# Patient Record
Sex: FEMALE | Race: BLACK OR AFRICAN AMERICAN | Employment: FULL TIME | URBAN - NONMETROPOLITAN AREA
[De-identification: names, ages, dates, MRNs, and addresses within clinical notes are randomized per-mention and may not be internally consistent; named-entity substitution may affect disease eponyms.]

---
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
## 2023-05-21 ENCOUNTER — HOSPITAL ENCOUNTER (INPATIENT)
Age: 18
LOS: 3 days | Discharge: HOME | End: 2023-05-26
Attending: EMERGENCY MEDICINE | Admitting: PHYSICIAN ASSISTANT
Payer: MEDICAID

## 2023-05-21 ENCOUNTER — APPOINTMENT (OUTPATIENT)
Dept: CT IMAGING | Age: 18
End: 2023-05-21
Payer: MEDICAID

## 2023-05-21 DIAGNOSIS — K27.9 PEPTIC ULCER DISEASE: Primary | ICD-10-CM

## 2023-05-21 DIAGNOSIS — R11.2 NAUSEA AND VOMITING, UNSPECIFIED VOMITING TYPE: ICD-10-CM

## 2023-05-21 LAB
ALBUMIN SERPL BCG-MCNC: 5.3 G/DL (ref 3.5–5.1)
ALP SERPL-CCNC: 92 U/L (ref 38–126)
ALT SERPL W/O P-5'-P-CCNC: 15 U/L (ref 11–66)
ANION GAP SERPL CALC-SCNC: 17 MEQ/L (ref 8–16)
AST SERPL-CCNC: 19 U/L (ref 5–40)
B-HCG SERPL QL: NEGATIVE
BASOPHILS ABSOLUTE: 0 THOU/MM3 (ref 0–0.1)
BASOPHILS NFR BLD AUTO: 0.1 %
BILIRUB CONJ SERPL-MCNC: 0.3 MG/DL (ref 0–0.3)
BILIRUB SERPL-MCNC: 1.8 MG/DL (ref 0.3–1.2)
BUN SERPL-MCNC: 14 MG/DL (ref 7–22)
CALCIUM SERPL-MCNC: 10.2 MG/DL (ref 8.5–10.5)
CHLORIDE SERPL-SCNC: 101 MEQ/L (ref 98–111)
CO2 SERPL-SCNC: 20 MEQ/L (ref 23–33)
CREAT SERPL-MCNC: 0.6 MG/DL (ref 0.4–1.2)
DEPRECATED RDW RBC AUTO: 42.2 FL (ref 35–45)
EOSINOPHIL NFR BLD AUTO: 0 %
EOSINOPHILS ABSOLUTE: 0 THOU/MM3 (ref 0–0.4)
ERYTHROCYTE [DISTWIDTH] IN BLOOD BY AUTOMATED COUNT: 13.2 % (ref 11.5–14.5)
GFR SERPL CREATININE-BSD FRML MDRD: > 60 ML/MIN/1.73M2
GLUCOSE SERPL-MCNC: 95 MG/DL (ref 70–108)
HCT VFR BLD AUTO: 38.9 % (ref 37–47)
HGB BLD-MCNC: 13.2 GM/DL (ref 12–16)
IMM GRANULOCYTES # BLD AUTO: 0.01 THOU/MM3 (ref 0–0.07)
IMM GRANULOCYTES NFR BLD AUTO: 0.1 %
LACTATE SERPL-SCNC: 2.1 MMOL/L (ref 0.5–2)
LIPASE SERPL-CCNC: 15.5 U/L (ref 5.6–51.3)
LYMPHOCYTES ABSOLUTE: 1.3 THOU/MM3 (ref 1–4.8)
LYMPHOCYTES NFR BLD AUTO: 19.2 %
MCH RBC QN AUTO: 29.8 PG (ref 26–33)
MCHC RBC AUTO-ENTMCNC: 33.9 GM/DL (ref 32.2–35.5)
MCV RBC AUTO: 87.8 FL (ref 81–99)
MONOCYTES ABSOLUTE: 0.7 THOU/MM3 (ref 0.4–1.3)
MONOCYTES NFR BLD AUTO: 9.8 %
NEUTROPHILS NFR BLD AUTO: 70.8 %
NRBC BLD AUTO-RTO: 0 /100 WBC
OSMOLALITY SERPL CALC.SUM OF ELEC: 276 MOSMOL/KG (ref 275–300)
PLATELET # BLD AUTO: 368 THOU/MM3 (ref 130–400)
PMV BLD AUTO: 10.9 FL (ref 9.4–12.4)
POTASSIUM SERPL-SCNC: 3.3 MEQ/L (ref 3.5–5.2)
PROT SERPL-MCNC: 7.7 G/DL (ref 6.1–8)
RBC # BLD AUTO: 4.43 MILL/MM3 (ref 4.2–5.4)
SEGMENTED NEUTROPHILS ABSOLUTE COUNT: 4.8 THOU/MM3 (ref 1.8–7.7)
SODIUM SERPL-SCNC: 138 MEQ/L (ref 135–145)
WBC # BLD AUTO: 6.8 THOU/MM3 (ref 4.8–10.8)

## 2023-05-21 PROCEDURE — 6360000002 HC RX W HCPCS: Performed by: STUDENT IN AN ORGANIZED HEALTH CARE EDUCATION/TRAINING PROGRAM

## 2023-05-21 PROCEDURE — 96374 THER/PROPH/DIAG INJ IV PUSH: CPT

## 2023-05-21 PROCEDURE — 6360000002 HC RX W HCPCS: Performed by: EMERGENCY MEDICINE

## 2023-05-21 PROCEDURE — 83690 ASSAY OF LIPASE: CPT

## 2023-05-21 PROCEDURE — 96361 HYDRATE IV INFUSION ADD-ON: CPT

## 2023-05-21 PROCEDURE — 83605 ASSAY OF LACTIC ACID: CPT

## 2023-05-21 PROCEDURE — 6360000002 HC RX W HCPCS: Performed by: PHYSICIAN ASSISTANT

## 2023-05-21 PROCEDURE — 96376 TX/PRO/DX INJ SAME DRUG ADON: CPT

## 2023-05-21 PROCEDURE — 74176 CT ABD & PELVIS W/O CONTRAST: CPT

## 2023-05-21 PROCEDURE — 82248 BILIRUBIN DIRECT: CPT

## 2023-05-21 PROCEDURE — G0378 HOSPITAL OBSERVATION PER HR: HCPCS

## 2023-05-21 PROCEDURE — 85025 COMPLETE CBC W/AUTO DIFF WBC: CPT

## 2023-05-21 PROCEDURE — 99222 1ST HOSP IP/OBS MODERATE 55: CPT | Performed by: PHYSICIAN ASSISTANT

## 2023-05-21 PROCEDURE — 36415 COLL VENOUS BLD VENIPUNCTURE: CPT

## 2023-05-21 PROCEDURE — 80053 COMPREHEN METABOLIC PANEL: CPT

## 2023-05-21 PROCEDURE — 84703 CHORIONIC GONADOTROPIN ASSAY: CPT

## 2023-05-21 PROCEDURE — 96375 TX/PRO/DX INJ NEW DRUG ADDON: CPT

## 2023-05-21 PROCEDURE — 2580000003 HC RX 258: Performed by: STUDENT IN AN ORGANIZED HEALTH CARE EDUCATION/TRAINING PROGRAM

## 2023-05-21 PROCEDURE — 99285 EMERGENCY DEPT VISIT HI MDM: CPT

## 2023-05-21 PROCEDURE — 96372 THER/PROPH/DIAG INJ SC/IM: CPT

## 2023-05-21 RX ORDER — KETOROLAC TROMETHAMINE 30 MG/ML
30 INJECTION, SOLUTION INTRAMUSCULAR; INTRAVENOUS ONCE
Status: COMPLETED | OUTPATIENT
Start: 2023-05-21 | End: 2023-05-21

## 2023-05-21 RX ORDER — POLYETHYLENE GLYCOL 3350 17 G/17G
17 POWDER, FOR SOLUTION ORAL DAILY PRN
Status: DISCONTINUED | OUTPATIENT
Start: 2023-05-21 | End: 2023-05-26 | Stop reason: HOSPADM

## 2023-05-21 RX ORDER — ONDANSETRON 2 MG/ML
4 INJECTION INTRAMUSCULAR; INTRAVENOUS ONCE
Status: COMPLETED | OUTPATIENT
Start: 2023-05-21 | End: 2023-05-21

## 2023-05-21 RX ORDER — PROMETHAZINE HYDROCHLORIDE 25 MG/ML
6.25 INJECTION, SOLUTION INTRAMUSCULAR; INTRAVENOUS ONCE
Status: COMPLETED | OUTPATIENT
Start: 2023-05-21 | End: 2023-05-21

## 2023-05-21 RX ORDER — ONDANSETRON 2 MG/ML
4 INJECTION INTRAMUSCULAR; INTRAVENOUS EVERY 6 HOURS PRN
Status: DISCONTINUED | OUTPATIENT
Start: 2023-05-21 | End: 2023-05-26 | Stop reason: HOSPADM

## 2023-05-21 RX ORDER — MAGNESIUM SULFATE IN WATER 40 MG/ML
2000 INJECTION, SOLUTION INTRAVENOUS PRN
Status: DISCONTINUED | OUTPATIENT
Start: 2023-05-21 | End: 2023-05-26 | Stop reason: HOSPADM

## 2023-05-21 RX ORDER — SODIUM CHLORIDE 0.9 % (FLUSH) 0.9 %
5-40 SYRINGE (ML) INJECTION PRN
Status: DISCONTINUED | OUTPATIENT
Start: 2023-05-21 | End: 2023-05-26 | Stop reason: HOSPADM

## 2023-05-21 RX ORDER — POTASSIUM CHLORIDE 7.45 MG/ML
10 INJECTION INTRAVENOUS PRN
Status: DISCONTINUED | OUTPATIENT
Start: 2023-05-21 | End: 2023-05-24

## 2023-05-21 RX ORDER — ACETAMINOPHEN 325 MG/1
650 TABLET ORAL EVERY 6 HOURS PRN
Status: DISCONTINUED | OUTPATIENT
Start: 2023-05-21 | End: 2023-05-26 | Stop reason: HOSPADM

## 2023-05-21 RX ORDER — SODIUM CHLORIDE 9 MG/ML
INJECTION, SOLUTION INTRAVENOUS CONTINUOUS
Status: DISCONTINUED | OUTPATIENT
Start: 2023-05-21 | End: 2023-05-22

## 2023-05-21 RX ORDER — SODIUM CHLORIDE 9 MG/ML
INJECTION, SOLUTION INTRAVENOUS PRN
Status: DISCONTINUED | OUTPATIENT
Start: 2023-05-21 | End: 2023-05-26 | Stop reason: HOSPADM

## 2023-05-21 RX ORDER — SODIUM CHLORIDE 0.9 % (FLUSH) 0.9 %
5-40 SYRINGE (ML) INJECTION EVERY 12 HOURS SCHEDULED
Status: DISCONTINUED | OUTPATIENT
Start: 2023-05-21 | End: 2023-05-26 | Stop reason: HOSPADM

## 2023-05-21 RX ORDER — ONDANSETRON 4 MG/1
4 TABLET, ORALLY DISINTEGRATING ORAL EVERY 8 HOURS PRN
Status: DISCONTINUED | OUTPATIENT
Start: 2023-05-21 | End: 2023-05-26 | Stop reason: HOSPADM

## 2023-05-21 RX ORDER — ACETAMINOPHEN 650 MG/1
650 SUPPOSITORY RECTAL EVERY 6 HOURS PRN
Status: DISCONTINUED | OUTPATIENT
Start: 2023-05-21 | End: 2023-05-26 | Stop reason: HOSPADM

## 2023-05-21 RX ORDER — CAPSAICIN 0.025 %
CREAM (GRAM) TOPICAL 2 TIMES DAILY
Status: DISCONTINUED | OUTPATIENT
Start: 2023-05-21 | End: 2023-05-26 | Stop reason: HOSPADM

## 2023-05-21 RX ORDER — 0.9 % SODIUM CHLORIDE 0.9 %
500 INTRAVENOUS SOLUTION INTRAVENOUS ONCE
Status: COMPLETED | OUTPATIENT
Start: 2023-05-21 | End: 2023-05-22

## 2023-05-21 RX ORDER — POTASSIUM CHLORIDE 20 MEQ/1
40 TABLET, EXTENDED RELEASE ORAL PRN
Status: DISCONTINUED | OUTPATIENT
Start: 2023-05-21 | End: 2023-05-24

## 2023-05-21 RX ADMIN — KETOROLAC TROMETHAMINE 30 MG: 30 INJECTION, SOLUTION INTRAMUSCULAR; INTRAVENOUS at 17:18

## 2023-05-21 RX ADMIN — SODIUM CHLORIDE 500 ML: 9 INJECTION, SOLUTION INTRAVENOUS at 17:18

## 2023-05-21 RX ADMIN — PROMETHAZINE HYDROCHLORIDE 6.25 MG: 25 INJECTION, SOLUTION INTRAMUSCULAR; INTRAVENOUS at 19:14

## 2023-05-21 RX ADMIN — ONDANSETRON 4 MG: 2 INJECTION INTRAMUSCULAR; INTRAVENOUS at 17:18

## 2023-05-21 RX ADMIN — ONDANSETRON 4 MG: 2 INJECTION INTRAMUSCULAR; INTRAVENOUS at 18:12

## 2023-05-21 RX ADMIN — ONDANSETRON 4 MG: 2 INJECTION INTRAMUSCULAR; INTRAVENOUS at 23:59

## 2023-05-21 ASSESSMENT — ENCOUNTER SYMPTOMS
NAUSEA: 1
EYES NEGATIVE: 1
VOMITING: 1
RESPIRATORY NEGATIVE: 1
ALLERGIC/IMMUNOLOGIC NEGATIVE: 1
DIARRHEA: 0
ABDOMINAL PAIN: 1

## 2023-05-21 ASSESSMENT — PAIN DESCRIPTION - LOCATION
LOCATION: ABDOMEN
LOCATION: ABDOMEN

## 2023-05-21 ASSESSMENT — PAIN DESCRIPTION - PAIN TYPE: TYPE: CHRONIC PAIN

## 2023-05-21 ASSESSMENT — PAIN DESCRIPTION - ORIENTATION: ORIENTATION: LOWER

## 2023-05-21 ASSESSMENT — PAIN - FUNCTIONAL ASSESSMENT
PAIN_FUNCTIONAL_ASSESSMENT: 0-10
PAIN_FUNCTIONAL_ASSESSMENT: FACE, LEGS, ACTIVITY, CRY, AND CONSOLABILITY (FLACC)

## 2023-05-21 ASSESSMENT — PAIN DESCRIPTION - DESCRIPTORS: DESCRIPTORS: PRESSURE

## 2023-05-21 ASSESSMENT — PAIN SCALES - GENERAL: PAINLEVEL_OUTOF10: 10

## 2023-05-22 LAB
AMORPH SED URNS QL MICRO: ABNORMAL
ANION GAP SERPL CALC-SCNC: 17 MEQ/L (ref 8–16)
BACTERIA: ABNORMAL
BASOPHILS ABSOLUTE: 0 THOU/MM3 (ref 0–0.1)
BASOPHILS NFR BLD AUTO: 0.3 %
BILIRUB UR QL STRIP: NEGATIVE
BUN SERPL-MCNC: 16 MG/DL (ref 7–22)
CALCIUM SERPL-MCNC: 9.9 MG/DL (ref 8.5–10.5)
CASTS #/AREA URNS LPF: ABNORMAL /LPF
CASTS #/AREA URNS LPF: ABNORMAL /LPF
CHARACTER UR: CLEAR
CHARCOAL URNS QL MICRO: ABNORMAL
CHLORIDE SERPL-SCNC: 102 MEQ/L (ref 98–111)
CO2 SERPL-SCNC: 19 MEQ/L (ref 23–33)
COLOR UR: YELLOW
CREAT SERPL-MCNC: 0.9 MG/DL (ref 0.4–1.2)
CRYSTALS URNS QL MICRO: ABNORMAL
DEPRECATED RDW RBC AUTO: 44.2 FL (ref 35–45)
EOSINOPHIL NFR BLD AUTO: 0 %
EOSINOPHILS ABSOLUTE: 0 THOU/MM3 (ref 0–0.4)
EPITHELIAL CELLS, UA: ABNORMAL /HPF
ERYTHROCYTE [DISTWIDTH] IN BLOOD BY AUTOMATED COUNT: 13.5 % (ref 11.5–14.5)
GFR SERPL CREATININE-BSD FRML MDRD: > 60 ML/MIN/1.73M2
GLUCOSE SERPL-MCNC: 99 MG/DL (ref 70–108)
GLUCOSE UR QL STRIP.AUTO: NEGATIVE MG/DL
HCT VFR BLD AUTO: 36.6 % (ref 37–47)
HGB BLD-MCNC: 12 GM/DL (ref 12–16)
HGB UR QL STRIP.AUTO: NEGATIVE
IMM GRANULOCYTES # BLD AUTO: 0.02 THOU/MM3 (ref 0–0.07)
IMM GRANULOCYTES NFR BLD AUTO: 0.3 %
KETONES UR QL STRIP.AUTO: 15
LACTATE SERPL-SCNC: 1.6 MMOL/L (ref 0.5–2)
LEUKOCYTE ESTERASE UR QL STRIP.AUTO: NEGATIVE
LYMPHOCYTES ABSOLUTE: 1.5 THOU/MM3 (ref 1–4.8)
LYMPHOCYTES NFR BLD AUTO: 19.3 %
MCH RBC QN AUTO: 29.3 PG (ref 26–33)
MCHC RBC AUTO-ENTMCNC: 32.8 GM/DL (ref 32.2–35.5)
MCV RBC AUTO: 89.5 FL (ref 81–99)
MONOCYTES ABSOLUTE: 0.8 THOU/MM3 (ref 0.4–1.3)
MONOCYTES NFR BLD AUTO: 10.4 %
MUCOUS THREADS URNS QL MICRO: ABNORMAL
NEUTROPHILS NFR BLD AUTO: 69.7 %
NITRITE UR QL STRIP.AUTO: NEGATIVE
NRBC BLD AUTO-RTO: 0 /100 WBC
PH UR STRIP.AUTO: 6.5 [PH] (ref 5–9)
PLATELET # BLD AUTO: 354 THOU/MM3 (ref 130–400)
PMV BLD AUTO: 11.1 FL (ref 9.4–12.4)
POTASSIUM SERPL-SCNC: 3.7 MEQ/L (ref 3.5–5.2)
PROT UR STRIP.AUTO-MCNC: 100 MG/DL
RBC # BLD AUTO: 4.09 MILL/MM3 (ref 4.2–5.4)
RBC #/AREA URNS HPF: ABNORMAL /HPF
RENAL EPI CELLS #/AREA URNS HPF: ABNORMAL /[HPF]
SEGMENTED NEUTROPHILS ABSOLUTE COUNT: 5.3 THOU/MM3 (ref 1.8–7.7)
SODIUM SERPL-SCNC: 138 MEQ/L (ref 135–145)
SP GR UR REFRACT.AUTO: 1.02 (ref 1–1.03)
UROBILINOGEN UR QL STRIP.AUTO: 1 EU/DL (ref 0–1)
WBC # BLD AUTO: 7.6 THOU/MM3 (ref 4.8–10.8)
WBC #/AREA URNS HPF: ABNORMAL /HPF
YEAST LIKE FUNGI URNS QL MICRO: ABNORMAL

## 2023-05-22 PROCEDURE — 6360000002 HC RX W HCPCS: Performed by: PHYSICIAN ASSISTANT

## 2023-05-22 PROCEDURE — 36415 COLL VENOUS BLD VENIPUNCTURE: CPT

## 2023-05-22 PROCEDURE — C9113 INJ PANTOPRAZOLE SODIUM, VIA: HCPCS | Performed by: PHYSICIAN ASSISTANT

## 2023-05-22 PROCEDURE — 2580000003 HC RX 258: Performed by: PHYSICIAN ASSISTANT

## 2023-05-22 PROCEDURE — 6360000002 HC RX W HCPCS: Performed by: NURSE PRACTITIONER

## 2023-05-22 PROCEDURE — 83605 ASSAY OF LACTIC ACID: CPT

## 2023-05-22 PROCEDURE — 81001 URINALYSIS AUTO W/SCOPE: CPT

## 2023-05-22 PROCEDURE — 99233 SBSQ HOSP IP/OBS HIGH 50: CPT | Performed by: PHYSICIAN ASSISTANT

## 2023-05-22 PROCEDURE — G0378 HOSPITAL OBSERVATION PER HR: HCPCS

## 2023-05-22 PROCEDURE — 80048 BASIC METABOLIC PNL TOTAL CA: CPT

## 2023-05-22 PROCEDURE — 85025 COMPLETE CBC W/AUTO DIFF WBC: CPT

## 2023-05-22 RX ORDER — SODIUM CHLORIDE, SODIUM LACTATE, POTASSIUM CHLORIDE, CALCIUM CHLORIDE 600; 310; 30; 20 MG/100ML; MG/100ML; MG/100ML; MG/100ML
INJECTION, SOLUTION INTRAVENOUS CONTINUOUS
Status: DISCONTINUED | OUTPATIENT
Start: 2023-05-22 | End: 2023-05-26 | Stop reason: HOSPADM

## 2023-05-22 RX ORDER — DIPHENHYDRAMINE HYDROCHLORIDE 50 MG/ML
25 INJECTION INTRAMUSCULAR; INTRAVENOUS EVERY 6 HOURS PRN
Status: DISCONTINUED | OUTPATIENT
Start: 2023-05-22 | End: 2023-05-26 | Stop reason: HOSPADM

## 2023-05-22 RX ORDER — PANTOPRAZOLE SODIUM 40 MG/10ML
40 INJECTION, POWDER, LYOPHILIZED, FOR SOLUTION INTRAVENOUS DAILY
Status: DISCONTINUED | OUTPATIENT
Start: 2023-05-22 | End: 2023-05-26 | Stop reason: ALTCHOICE

## 2023-05-22 RX ORDER — PROMETHAZINE HYDROCHLORIDE 25 MG/ML
6.25 INJECTION, SOLUTION INTRAMUSCULAR; INTRAVENOUS EVERY 6 HOURS PRN
Status: DISCONTINUED | OUTPATIENT
Start: 2023-05-22 | End: 2023-05-24

## 2023-05-22 RX ORDER — PROMETHAZINE HYDROCHLORIDE 25 MG/1
25 SUPPOSITORY RECTAL EVERY 6 HOURS PRN
Status: DISCONTINUED | OUTPATIENT
Start: 2023-05-22 | End: 2023-05-24

## 2023-05-22 RX ORDER — ONDANSETRON 2 MG/ML
4 INJECTION INTRAMUSCULAR; INTRAVENOUS ONCE
Status: COMPLETED | OUTPATIENT
Start: 2023-05-22 | End: 2023-05-22

## 2023-05-22 RX ADMIN — DIPHENHYDRAMINE HYDROCHLORIDE 25 MG: 50 INJECTION, SOLUTION INTRAMUSCULAR; INTRAVENOUS at 04:08

## 2023-05-22 RX ADMIN — ONDANSETRON 4 MG: 2 INJECTION INTRAMUSCULAR; INTRAVENOUS at 09:45

## 2023-05-22 RX ADMIN — SODIUM CHLORIDE, POTASSIUM CHLORIDE, SODIUM LACTATE AND CALCIUM CHLORIDE: 600; 310; 30; 20 INJECTION, SOLUTION INTRAVENOUS at 12:40

## 2023-05-22 RX ADMIN — DIPHENHYDRAMINE HYDROCHLORIDE 25 MG: 50 INJECTION, SOLUTION INTRAMUSCULAR; INTRAVENOUS at 21:50

## 2023-05-22 RX ADMIN — PANTOPRAZOLE SODIUM 40 MG: 40 INJECTION, POWDER, LYOPHILIZED, FOR SOLUTION INTRAVENOUS at 12:38

## 2023-05-22 RX ADMIN — PROMETHAZINE HYDROCHLORIDE 6.25 MG: 25 INJECTION INTRAMUSCULAR; INTRAVENOUS at 08:13

## 2023-05-22 RX ADMIN — ONDANSETRON 4 MG: 2 INJECTION INTRAMUSCULAR; INTRAVENOUS at 16:09

## 2023-05-22 RX ADMIN — DIPHENHYDRAMINE HYDROCHLORIDE 25 MG: 50 INJECTION, SOLUTION INTRAMUSCULAR; INTRAVENOUS at 13:21

## 2023-05-22 RX ADMIN — SODIUM CHLORIDE: 9 INJECTION, SOLUTION INTRAVENOUS at 00:19

## 2023-05-22 RX ADMIN — ONDANSETRON 4 MG: 2 INJECTION INTRAMUSCULAR; INTRAVENOUS at 21:50

## 2023-05-22 RX ADMIN — ONDANSETRON 4 MG: 2 INJECTION INTRAMUSCULAR; INTRAVENOUS at 03:43

## 2023-05-22 ASSESSMENT — PAIN SCALES - GENERAL
PAINLEVEL_OUTOF10: 4
PAINLEVEL_OUTOF10: 6
PAINLEVEL_OUTOF10: 3

## 2023-05-22 ASSESSMENT — PAIN DESCRIPTION - DESCRIPTORS: DESCRIPTORS: CRAMPING;ACHING;DISCOMFORT

## 2023-05-22 ASSESSMENT — PAIN DESCRIPTION - ORIENTATION: ORIENTATION: LOWER

## 2023-05-22 ASSESSMENT — PAIN DESCRIPTION - PAIN TYPE: TYPE: ACUTE PAIN

## 2023-05-22 ASSESSMENT — PAIN DESCRIPTION - LOCATION
LOCATION: ABDOMEN
LOCATION: ABDOMEN

## 2023-05-22 ASSESSMENT — PAIN - FUNCTIONAL ASSESSMENT: PAIN_FUNCTIONAL_ASSESSMENT: ACTIVITIES ARE NOT PREVENTED

## 2023-05-22 NOTE — PROGRESS NOTES
Pt admitted to  5K11 via in a wheelchair from ED. Complaints: intractable nausea and vomiting. IV normal saline infusing into the hand right, condition patent and no redness at a rate of gravity mls/ hour with about 500 mls in the bag still. IV site free of s/s of infection or infiltration. Vital signs obtained. Assessment and data collection initiated. Two nurse skin assessment performed by Nayeli RN and Sonia RN. Oriented to room. Policies and procedures for 5 explained. All questions answered with no further questions at this time. Fall prevention and safety brochure discussed with patient. Bed alarm on. Call light in reach. Oriented to room. Shandra Kong RN, RN 5/22/2023 12:09 AM     Explained patients right to have family, representative or physician notified of their admission. Patient has requested for physician to be notified. Patient has Declined for family/representative to be notified.

## 2023-05-22 NOTE — H&P
Hospitalist - History & Physical      Patient: Shin Munoz    Unit/Bed:36/036A  YOB: 2005  MRN: [id number]   Acct: [de-identified]   PCP: Sathish Marie. JEANNIE Huynh - ALEE      Assessment and Plan:        Intractable nausea and vomiting:   Started one day ago  Epigastric pain and cramping  Unable to tolerate po  No diarrhea  Hypokalemia:   Replacement protocol  Cannabis use:   Daily or twice daily use  Concern for cannabinoid hyperemesis syndrome   Will offer hot showers and capsicin cream  H/o Colitis:   No findings on CT are suggestive of colitis  No diarrhea  Will hold on antibiotics at this time      CC:  nausea and vomiting    HPI: Patient presents to the ED with one day of nausea and vomiting with abdominal pain. The patient denies diarrhea but has had loose stools. The patient states she has a history of colitis. There are no fevers, no blood in stool, no shortness of breath, no dysuria. Patient does not smoke cigarettes but does smoke marijuana at least once daily. The patient is found to have hypokalemia in the ED and no acute findings on the CT abd/pelvis. Patient has failed a po trial and will be admitted for further evaluation and treatment of abdominal pain. ROS: Review of Systems   Constitutional: Negative. HENT: Negative. Eyes: Negative. Respiratory: Negative. Cardiovascular: Negative. Gastrointestinal:  Positive for abdominal pain, nausea and vomiting. Negative for diarrhea. Endocrine: Negative. Genitourinary: Negative. Musculoskeletal: Negative. Skin: Negative. Allergic/Immunologic: Negative. Neurological: Negative. Hematological: Negative. Psychiatric/Behavioral: Negative.        PMH:    Past Medical History:   Diagnosis Date    Abdominal pain      SHX:    Social History     Socioeconomic History    Marital status: Single     Spouse name: Not on file    Number of children: Not on file    Years of education: Not on file    Highest

## 2023-05-22 NOTE — ED NOTES
ED to inpatient nurses report    Chief Complaint   Patient presents with    Abdominal Pain    Nausea      Present to ED from home  LOC: alert and orientated to name, place, date  Vital signs   Vitals:    05/21/23 1631 05/21/23 1932 05/21/23 1938   BP: 135/63     Pulse: 67     Resp: 17     Temp: 98.4 °F (36.9 °C)     SpO2: 100% 100% 99%   Weight: 160 lb (72.6 kg)     Height: 5' 4\" (1.626 m)        Oxygen Baseline Ra    Current needs required Ra Bipap/Cpap No  LDAs:   Peripheral IV 05/21/23 Posterior;Right Hand (Active)     Mobility: Independent  Pending ED orders: None  Present condition: Stable    Electronically signed by Jeanne Ugalde RN on 5/21/2023 at 9:51 PM      Jeanne Ugalde RN  05/21/23 6233

## 2023-05-22 NOTE — PROGRESS NOTES
Hospitalist Progress Note    Patient:  Aadlgisa Palacio    Unit/Bed:5K-11/011-A  YOB: 2005  MRN: [id number]   Acct: [de-identified]   PCP: Prakash Bhatt. JEANNIE Vasques - CNP  Code Status: Full Code  Date of Admission: 5/21/2023    Expected Discharge: 1-2 days  Disposition: Home when tolerating PO intake. Assessment/Plan:    Intractable N/V:   CT A/P unremarkable. Likely cannabinoid hyperemesis syndrome. Cont capsicin cream, hot showers. Continue PRN antiemetics, IVF. Trial clear liquid diet. Hypokalemia: 2/2 #1. Replaced per protocol     Mild lactic acidosis: secondary to #1. Continue IVF. Repeat LA. Cannabis abuse: daily use. Concern for cannabinoid hyperemesis syndrome. Cessation advised. H/o colitis: per patient. No findings on CT. No indication for abx at this time. Hypertension: noted one time, likely d/t pain. Will monitor. No indication for antihypertensives at this time. Chief Complaint: N/V    HPI / Hospital Course: Per HPI: \" Patient presents to the ED with one day of nausea and vomiting with abdominal pain. The patient denies diarrhea but has had loose stools. The patient states she has a history of colitis. There are no fevers, no blood in stool, no shortness of breath, no dysuria. Patient does not smoke cigarettes but does smoke marijuana at least once daily. The patient is found to have hypokalemia in the ED and no acute findings on the CT abd/pelvis. Patient has failed a po trial and will be admitted for further evaluation and treatment of abdominal pain. \"     Subjective (past 24 hours): pt continues to have uncontrolled vomiting, has not eaten anything yet today. She reports soreness from throwing up but no abdominal pain. No f/c, sob, cp. Last BM a couple days ago per pt. ROS: Pertinent positives as noted in HPI. All other systems reviewed and negative.       Past medical history, family history, social history and allergies reviewed

## 2023-05-22 NOTE — ED NOTES
Pt on call light states back to feeling how she did when she came in. Requesting additional nausea medication. Dr. Chucho Dugan notified. Additional warm blankets provided.       Katie Calvo RN  05/21/23 2028

## 2023-05-22 NOTE — ED NOTES
Pt on call light rprts feeling like she's having a possible reaction. Feeling nauseated and dry heaving. HR 66, oxygen 100% on RA. Warm blankets applied. Pt requesting benadryl. Dr. Dilcia Yan notified.      Lore Lancaster, RN  05/21/23 2027

## 2023-05-22 NOTE — CARE COORDINATION
Case Management Assessment  Initial Evaluation    Date/Time of Evaluation: 5/22/2023 1:40 PM  Assessment Completed by: Maria C Barakat RN    If patient is discharged prior to next notation, then this note serves as note for discharge by case management. Patient Name: Shin Munoz                   YOB: 2005  Diagnosis: Peptic ulcer disease [K27.9]  Intractable nausea and vomiting [R11.2]  Nausea and vomiting, unspecified vomiting type [R11.2]                   Date / Time: 5/21/2023  4:22 PM  [facility]: [facility]     Patient Admission Status: Observation   Readmission Risk Low 0-14, Mod 15-19), High > 20: No data recorded  Current PCP: Sathish Marie. JEANNIE Camp Chi, CNP  PCP verified by CM? Yes    Chart Reviewed: Yes      History Provided by: Patient  Patient Orientation: Alert and Oriented    Patient Cognition: Alert    Hospitalization in the last 30 days (Readmission):  No    If yes, Readmission Assessment in CM Navigator will be completed. Advance Directives:      Code Status: Full Code   Patient's Primary Decision Maker is: Legal Next of Kin      Discharge Planning:    Patient lives with: Parent Type of Home: House  Primary Care Giver: Self  Patient Support Systems include: Parent   Current Financial resources: Other (Comment), None  Current community resources: None  Current services prior to admission: None            Current DME:              Type of Home Care services:  None    ADLS  Prior functional level: Independent in ADLs/IADLs  Current functional level: Independent in ADLs/IADLs    Family can provide assistance at DC: Yes  Would you like Case Management to discuss the discharge plan with any other family members/significant others, and if so, who?  No  Plans to Return to Present Housing: Yes  Other Identified Issues/Barriers to RETURNING to current housing: None  Potential Assistance needed at discharge: N/A            Potential DME:    Patient expects to discharge to: [address] [place] [address]

## 2023-05-22 NOTE — PLAN OF CARE
Problem: Pain  Goal: Verbalizes/displays adequate comfort level or baseline comfort level  Outcome: Progressing   Pain Assessment: 0-10  Pain Level: 6   Patient's Stated Pain Goal: 0 - No pain   Is pain goal met at this time? No    Problem: Safety - Adult  Goal: Free from fall injury  Outcome: Progressing   Fall assessment completed. Patient using call light appropriately to call for assistance with ambulation to bathroom. Personal items within reach. Patient is also compliant with use of non-skid slippers. Care plan reviewed with patient. Patient verbalize understanding of the plan of care and contribute to goal setting.

## 2023-05-23 ENCOUNTER — APPOINTMENT (OUTPATIENT)
Dept: GENERAL RADIOLOGY | Age: 18
End: 2023-05-23
Payer: MEDICAID

## 2023-05-23 PROBLEM — R11.2 INTRACTABLE VOMITING WITH NAUSEA: Status: ACTIVE | Noted: 2023-05-23

## 2023-05-23 LAB
ALBUMIN SERPL BCG-MCNC: 4.6 G/DL (ref 3.5–5.1)
ALP SERPL-CCNC: 86 U/L (ref 38–126)
ALT SERPL W/O P-5'-P-CCNC: 29 U/L (ref 11–66)
ANION GAP SERPL CALC-SCNC: 13 MEQ/L (ref 8–16)
ANION GAP SERPL CALC-SCNC: 17 MEQ/L (ref 8–16)
AST SERPL-CCNC: 33 U/L (ref 5–40)
BILIRUB CONJ SERPL-MCNC: 0.5 MG/DL (ref 0–0.3)
BILIRUB SERPL-MCNC: 2.8 MG/DL (ref 0.3–1.2)
BUN SERPL-MCNC: 12 MG/DL (ref 7–22)
BUN SERPL-MCNC: 15 MG/DL (ref 7–22)
CALCIUM SERPL-MCNC: 9.3 MG/DL (ref 8.5–10.5)
CALCIUM SERPL-MCNC: 9.5 MG/DL (ref 8.5–10.5)
CHLORIDE SERPL-SCNC: 101 MEQ/L (ref 98–111)
CHLORIDE SERPL-SCNC: 97 MEQ/L (ref 98–111)
CO2 SERPL-SCNC: 21 MEQ/L (ref 23–33)
CO2 SERPL-SCNC: 23 MEQ/L (ref 23–33)
CREAT SERPL-MCNC: 0.6 MG/DL (ref 0.4–1.2)
CREAT SERPL-MCNC: 0.7 MG/DL (ref 0.4–1.2)
GFR SERPL CREATININE-BSD FRML MDRD: > 60 ML/MIN/1.73M2
GFR SERPL CREATININE-BSD FRML MDRD: > 60 ML/MIN/1.73M2
GLUCOSE SERPL-MCNC: 82 MG/DL (ref 70–108)
GLUCOSE SERPL-MCNC: 86 MG/DL (ref 70–108)
MAGNESIUM SERPL-MCNC: 2.4 MG/DL (ref 1.6–2.4)
MAGNESIUM SERPL-MCNC: 2.5 MG/DL (ref 1.6–2.4)
PH BLDV: 7.49 [PH] (ref 7.31–7.41)
POTASSIUM SERPL-SCNC: 3 MEQ/L (ref 3.5–5.2)
POTASSIUM SERPL-SCNC: 3.1 MEQ/L (ref 3.5–5.2)
PROCALCITONIN SERPL IA-MCNC: 0.05 NG/ML (ref 0.01–0.09)
PROT SERPL-MCNC: 7.2 G/DL (ref 6.1–8)
SODIUM SERPL-SCNC: 135 MEQ/L (ref 135–145)
SODIUM SERPL-SCNC: 137 MEQ/L (ref 135–145)

## 2023-05-23 PROCEDURE — 6360000002 HC RX W HCPCS: Performed by: PHYSICIAN ASSISTANT

## 2023-05-23 PROCEDURE — 82248 BILIRUBIN DIRECT: CPT

## 2023-05-23 PROCEDURE — 99233 SBSQ HOSP IP/OBS HIGH 50: CPT | Performed by: PHYSICIAN ASSISTANT

## 2023-05-23 PROCEDURE — 1200000000 HC SEMI PRIVATE

## 2023-05-23 PROCEDURE — G0378 HOSPITAL OBSERVATION PER HR: HCPCS

## 2023-05-23 PROCEDURE — 84145 PROCALCITONIN (PCT): CPT

## 2023-05-23 PROCEDURE — 6370000000 HC RX 637 (ALT 250 FOR IP): Performed by: NURSE PRACTITIONER

## 2023-05-23 PROCEDURE — 83735 ASSAY OF MAGNESIUM: CPT

## 2023-05-23 PROCEDURE — 80053 COMPREHEN METABOLIC PANEL: CPT

## 2023-05-23 PROCEDURE — 71045 X-RAY EXAM CHEST 1 VIEW: CPT

## 2023-05-23 PROCEDURE — 82800 BLOOD PH: CPT

## 2023-05-23 PROCEDURE — 6360000002 HC RX W HCPCS: Performed by: NURSE PRACTITIONER

## 2023-05-23 PROCEDURE — 93005 ELECTROCARDIOGRAM TRACING: CPT | Performed by: PHYSICIAN ASSISTANT

## 2023-05-23 PROCEDURE — C9113 INJ PANTOPRAZOLE SODIUM, VIA: HCPCS | Performed by: PHYSICIAN ASSISTANT

## 2023-05-23 PROCEDURE — 36415 COLL VENOUS BLD VENIPUNCTURE: CPT

## 2023-05-23 RX ORDER — LANOLIN ALCOHOL/MO/W.PET/CERES
3 CREAM (GRAM) TOPICAL NIGHTLY PRN
Status: DISCONTINUED | OUTPATIENT
Start: 2023-05-23 | End: 2023-05-26 | Stop reason: HOSPADM

## 2023-05-23 RX ORDER — METOCLOPRAMIDE HYDROCHLORIDE 5 MG/ML
10 INJECTION INTRAMUSCULAR; INTRAVENOUS ONCE
Status: COMPLETED | OUTPATIENT
Start: 2023-05-23 | End: 2023-05-24

## 2023-05-23 RX ADMIN — DIPHENHYDRAMINE HYDROCHLORIDE 25 MG: 50 INJECTION, SOLUTION INTRAMUSCULAR; INTRAVENOUS at 04:14

## 2023-05-23 RX ADMIN — ONDANSETRON 4 MG: 2 INJECTION INTRAMUSCULAR; INTRAVENOUS at 11:21

## 2023-05-23 RX ADMIN — PANTOPRAZOLE SODIUM 40 MG: 40 INJECTION, POWDER, LYOPHILIZED, FOR SOLUTION INTRAVENOUS at 10:45

## 2023-05-23 RX ADMIN — ONDANSETRON 4 MG: 2 INJECTION INTRAMUSCULAR; INTRAVENOUS at 04:14

## 2023-05-23 RX ADMIN — PROMETHAZINE HYDROCHLORIDE 25 MG: 25 SUPPOSITORY RECTAL at 01:51

## 2023-05-23 RX ADMIN — ONDANSETRON 4 MG: 2 INJECTION INTRAMUSCULAR; INTRAVENOUS at 20:24

## 2023-05-23 ASSESSMENT — PAIN - FUNCTIONAL ASSESSMENT
PAIN_FUNCTIONAL_ASSESSMENT: ACTIVITIES ARE NOT PREVENTED

## 2023-05-23 ASSESSMENT — PAIN DESCRIPTION - LOCATION
LOCATION: ABDOMEN
LOCATION: ABDOMEN
LOCATION: CHEST

## 2023-05-23 ASSESSMENT — PAIN DESCRIPTION - DESCRIPTORS
DESCRIPTORS: ACHING;CRAMPING;DISCOMFORT
DESCRIPTORS: SHARP
DESCRIPTORS: ACHING

## 2023-05-23 ASSESSMENT — PAIN SCALES - GENERAL
PAINLEVEL_OUTOF10: 4

## 2023-05-23 ASSESSMENT — PAIN DESCRIPTION - PAIN TYPE
TYPE: ACUTE PAIN

## 2023-05-23 NOTE — PLAN OF CARE
Problem: Pain  Goal: Verbalizes/displays adequate comfort level or baseline comfort level  Outcome: Progressing  Flowsheets (Taken 5/23/2023 1430)  Verbalizes/displays adequate comfort level or baseline comfort level:   Encourage patient to monitor pain and request assistance   Assess pain using appropriate pain scale   Administer analgesics based on type and severity of pain and evaluate response   Implement non-pharmacological measures as appropriate and evaluate response     Problem: Gastrointestinal - Adult  Goal: Minimal or absence of nausea and vomiting  Outcome: Progressing  Flowsheets (Taken 5/23/2023 1431)  Minimal or absence of nausea and vomiting:   Administer IV fluids as ordered to ensure adequate hydration   Administer ordered antiemetic medications as needed

## 2023-05-23 NOTE — PROGRESS NOTES
Hospitalist Progress Note    Patient:  Shin Munoz    Unit/Bed:5K-11/011-A  YOB: 2005  MRN: [id number]   Acct: [de-identified]   PCP: Sathish Marei. JEANNIE Camp Chi - CNP  Code Status: Full Code  Date of Admission: 5/21/2023    Expected Discharge: 1-2 days  Disposition: Home when tolerating PO intake. Assessment/Plan:    Intractable N/V:   CT A/P unremarkable. Likely cannabinoid hyperemesis syndrome. Cont capsicin cream, hot showers. Continue PRN antiemetics, IVF. Trial clear liquid diet. Hypokalemia: 2/2 #1. Replace per protocol. 3.0 today     Mild lactic acidosis: secondary to #1. Continue IVF. Resolved. Cannabis abuse: daily use. Concern for cannabinoid hyperemesis syndrome. Cessation advised. H/o colitis: per patient. No findings on CT. No indication for abx at this time. Hypertension: noted one time, likely d/t pain. Will monitor. No indication for antihypertensives at this time. Isolated hyperbilirubinemia: likely Gilbert's Syndrome. Outpatient follow up. Chief Complaint: N/V    HPI / Hospital Course: Per HPI: \" Patient presents to the ED with one day of nausea and vomiting with abdominal pain. The patient denies diarrhea but has had loose stools. The patient states she has a history of colitis. There are no fevers, no blood in stool, no shortness of breath, no dysuria. Patient does not smoke cigarettes but does smoke marijuana at least once daily. The patient is found to have hypokalemia in the ED and no acute findings on the CT abd/pelvis. Patient has failed a po trial and will be admitted for further evaluation and treatment of abdominal pain. \"     5/22: Pt continues to have uncontrolled vomiting, has not eaten anything yet today. Subjective (past 24 hours): Pt reports continued vomiting. Not tolerating clears. K 3.0.       ROS: Pertinent positives as noted in HPI. All other systems reviewed and negative.       Past medical history, family

## 2023-05-24 LAB
ALBUMIN SERPL BCG-MCNC: 4.2 G/DL (ref 3.5–5.1)
ALP SERPL-CCNC: 84 U/L (ref 38–126)
ALT SERPL W/O P-5'-P-CCNC: 47 U/L (ref 11–66)
ANION GAP SERPL CALC-SCNC: 13 MEQ/L (ref 8–16)
AST SERPL-CCNC: 40 U/L (ref 5–40)
BILIRUB CONJ SERPL-MCNC: 0.3 MG/DL (ref 0–0.3)
BILIRUB SERPL-MCNC: 3.5 MG/DL (ref 0.3–1.2)
BUN SERPL-MCNC: 10 MG/DL (ref 7–22)
CALCIUM SERPL-MCNC: 9.1 MG/DL (ref 8.5–10.5)
CHLORIDE SERPL-SCNC: 99 MEQ/L (ref 98–111)
CO2 SERPL-SCNC: 22 MEQ/L (ref 23–33)
CREAT SERPL-MCNC: 0.5 MG/DL (ref 0.4–1.2)
DEPRECATED RDW RBC AUTO: 41.3 FL (ref 35–45)
EKG ATRIAL RATE: 53 BPM
EKG P AXIS: 59 DEGREES
EKG P-R INTERVAL: 144 MS
EKG Q-T INTERVAL: 446 MS
EKG QRS DURATION: 76 MS
EKG QTC CALCULATION (BAZETT): 418 MS
EKG R AXIS: 53 DEGREES
EKG T AXIS: 45 DEGREES
EKG VENTRICULAR RATE: 53 BPM
ERYTHROCYTE [DISTWIDTH] IN BLOOD BY AUTOMATED COUNT: 12.5 % (ref 11.5–14.5)
GFR SERPL CREATININE-BSD FRML MDRD: > 60 ML/MIN/1.73M2
GLUCOSE SERPL-MCNC: 86 MG/DL (ref 70–108)
HCT VFR BLD AUTO: 40.9 % (ref 37–47)
HGB BLD-MCNC: 13 GM/DL (ref 12–16)
MAGNESIUM SERPL-MCNC: 2.1 MG/DL (ref 1.6–2.4)
MCH RBC QN AUTO: 29.1 PG (ref 26–33)
MCHC RBC AUTO-ENTMCNC: 31.8 GM/DL (ref 32.2–35.5)
MCV RBC AUTO: 91.5 FL (ref 81–99)
PLATELET # BLD AUTO: 111 THOU/MM3 (ref 130–400)
PMV BLD AUTO: 11.2 FL (ref 9.4–12.4)
POTASSIUM SERPL-SCNC: 3.5 MEQ/L (ref 3.5–5.2)
PROT SERPL-MCNC: 6.4 G/DL (ref 6.1–8)
RBC # BLD AUTO: 4.47 MILL/MM3 (ref 4.2–5.4)
SODIUM SERPL-SCNC: 134 MEQ/L (ref 135–145)
WBC # BLD AUTO: 5.7 THOU/MM3 (ref 4.8–10.8)

## 2023-05-24 PROCEDURE — 83735 ASSAY OF MAGNESIUM: CPT

## 2023-05-24 PROCEDURE — 6360000002 HC RX W HCPCS: Performed by: NURSE PRACTITIONER

## 2023-05-24 PROCEDURE — 2580000003 HC RX 258: Performed by: PHYSICIAN ASSISTANT

## 2023-05-24 PROCEDURE — 93010 ELECTROCARDIOGRAM REPORT: CPT | Performed by: INTERNAL MEDICINE

## 2023-05-24 PROCEDURE — 1200000000 HC SEMI PRIVATE

## 2023-05-24 PROCEDURE — 6360000002 HC RX W HCPCS: Performed by: PHYSICIAN ASSISTANT

## 2023-05-24 PROCEDURE — C9113 INJ PANTOPRAZOLE SODIUM, VIA: HCPCS | Performed by: PHYSICIAN ASSISTANT

## 2023-05-24 PROCEDURE — 85027 COMPLETE CBC AUTOMATED: CPT

## 2023-05-24 PROCEDURE — 36415 COLL VENOUS BLD VENIPUNCTURE: CPT

## 2023-05-24 PROCEDURE — 80053 COMPREHEN METABOLIC PANEL: CPT

## 2023-05-24 PROCEDURE — 99232 SBSQ HOSP IP/OBS MODERATE 35: CPT | Performed by: PHYSICIAN ASSISTANT

## 2023-05-24 PROCEDURE — 82248 BILIRUBIN DIRECT: CPT

## 2023-05-24 PROCEDURE — 6370000000 HC RX 637 (ALT 250 FOR IP): Performed by: PHYSICIAN ASSISTANT

## 2023-05-24 RX ORDER — POTASSIUM CHLORIDE 7.45 MG/ML
10 INJECTION INTRAVENOUS PRN
Status: DISCONTINUED | OUTPATIENT
Start: 2023-05-24 | End: 2023-05-26 | Stop reason: HOSPADM

## 2023-05-24 RX ORDER — PROCHLORPERAZINE EDISYLATE 5 MG/ML
5 INJECTION INTRAMUSCULAR; INTRAVENOUS EVERY 4 HOURS PRN
Status: DISCONTINUED | OUTPATIENT
Start: 2023-05-24 | End: 2023-05-26

## 2023-05-24 RX ORDER — POTASSIUM CHLORIDE 29.8 MG/ML
20 INJECTION INTRAVENOUS PRN
Status: DISCONTINUED | OUTPATIENT
Start: 2023-05-24 | End: 2023-05-26 | Stop reason: HOSPADM

## 2023-05-24 RX ORDER — SCOLOPAMINE TRANSDERMAL SYSTEM 1 MG/1
1 PATCH, EXTENDED RELEASE TRANSDERMAL
Status: DISCONTINUED | OUTPATIENT
Start: 2023-05-24 | End: 2023-05-26 | Stop reason: HOSPADM

## 2023-05-24 RX ORDER — METOCLOPRAMIDE HYDROCHLORIDE 5 MG/ML
10 INJECTION INTRAMUSCULAR; INTRAVENOUS ONCE
Status: COMPLETED | OUTPATIENT
Start: 2023-05-24 | End: 2023-05-24

## 2023-05-24 RX ADMIN — PROCHLORPERAZINE EDISYLATE 5 MG: 5 INJECTION INTRAMUSCULAR; INTRAVENOUS at 11:37

## 2023-05-24 RX ADMIN — METOCLOPRAMIDE 10 MG: 5 INJECTION, SOLUTION INTRAMUSCULAR; INTRAVENOUS at 09:02

## 2023-05-24 RX ADMIN — ONDANSETRON 4 MG: 2 INJECTION INTRAMUSCULAR; INTRAVENOUS at 03:47

## 2023-05-24 RX ADMIN — DIPHENHYDRAMINE HYDROCHLORIDE 25 MG: 50 INJECTION, SOLUTION INTRAMUSCULAR; INTRAVENOUS at 21:02

## 2023-05-24 RX ADMIN — SODIUM CHLORIDE, PRESERVATIVE FREE 10 ML: 5 INJECTION INTRAVENOUS at 09:07

## 2023-05-24 RX ADMIN — DIPHENHYDRAMINE HYDROCHLORIDE 25 MG: 50 INJECTION, SOLUTION INTRAMUSCULAR; INTRAVENOUS at 00:44

## 2023-05-24 RX ADMIN — SODIUM CHLORIDE, POTASSIUM CHLORIDE, SODIUM LACTATE AND CALCIUM CHLORIDE: 600; 310; 30; 20 INJECTION, SOLUTION INTRAVENOUS at 21:11

## 2023-05-24 RX ADMIN — PROCHLORPERAZINE EDISYLATE 5 MG: 5 INJECTION INTRAMUSCULAR; INTRAVENOUS at 15:36

## 2023-05-24 RX ADMIN — SODIUM CHLORIDE, PRESERVATIVE FREE 10 ML: 5 INJECTION INTRAVENOUS at 00:45

## 2023-05-24 RX ADMIN — SODIUM CHLORIDE, POTASSIUM CHLORIDE, SODIUM LACTATE AND CALCIUM CHLORIDE: 600; 310; 30; 20 INJECTION, SOLUTION INTRAVENOUS at 15:40

## 2023-05-24 RX ADMIN — PANTOPRAZOLE SODIUM 40 MG: 40 INJECTION, POWDER, LYOPHILIZED, FOR SOLUTION INTRAVENOUS at 09:02

## 2023-05-24 RX ADMIN — METOCLOPRAMIDE 10 MG: 5 INJECTION, SOLUTION INTRAMUSCULAR; INTRAVENOUS at 00:39

## 2023-05-24 ASSESSMENT — PAIN SCALES - GENERAL: PAINLEVEL_OUTOF10: 0

## 2023-05-24 NOTE — PROGRESS NOTES
Patient is refusing to re-connect her IV fluids after a hot shower. Verbal risk and benefits explained and patient still refusing.

## 2023-05-24 NOTE — PLAN OF CARE
Problem: Pain  Goal: Verbalizes/displays adequate comfort level or baseline comfort level  5/24/2023 1457 by Tommy Mora RN  Outcome: Progressing     Problem: Safety - Adult  Goal: Free from fall injury  5/24/2023 1457 by Tommy Mora RN  Outcome: Progressing  Flowsheets (Taken 5/24/2023 0730)  Free From Fall Injury: Instruct family/caregiver on patient safety     Problem: Gastrointestinal - Adult  Goal: Minimal or absence of nausea and vomiting  5/24/2023 1457 by Tommy Mora RN  Outcome: Not Progressing  Flowsheets  Taken 5/24/2023 1245  Minimal or absence of nausea and vomiting: Administer IV fluids as ordered to ensure adequate hydration  Taken 5/24/2023 0845  Minimal or absence of nausea and vomiting: Administer IV fluids as ordered to ensure adequate hydration     Problem: Gastrointestinal - Adult  Goal: Maintains or returns to baseline bowel function  5/24/2023 1457 by Tommy Mora RN  Outcome: Progressing  Flowsheets  Taken 5/24/2023 1245  Maintains or returns to baseline bowel function: Assess bowel function  Taken 5/24/2023 0845  Maintains or returns to baseline bowel function: Assess bowel function     Problem: Nutrition Deficit:  Goal: Optimize nutritional status  5/24/2023 1457 by Tommy Mora RN  Outcome: Not Progressing     Problem: Gastrointestinal - Adult  Goal: Minimal or absence of nausea and vomiting  5/24/2023 1457 by Tommy Mora RN  Outcome: Not Progressing  Flowsheets  Taken 5/24/2023 1245  Minimal or absence of nausea and vomiting: Administer IV fluids as ordered to ensure adequate hydration  Taken 5/24/2023 0845  Minimal or absence of nausea and vomiting: Administer IV fluids as ordered to ensure adequate hydration  5/24/2023 0144 by Tre Elam RN  Outcome: Progressing  Flowsheets (Taken 5/24/2023 0144)  Minimal or absence of nausea and vomiting:   Administer IV fluids as ordered to ensure adequate hydration   Administer ordered antiemetic medications as

## 2023-05-24 NOTE — PROGRESS NOTES
Hospitalist Progress Note    Patient:  Rosa Wasserman    Unit/Bed:5K-11/011-A  YOB: 2005  MRN: [id number]   Acct: [de-identified]   PCP: Raphael Aquino. JEANNIE Alvarez CNP  Code Status: Full Code  Date of Admission: 5/21/2023    Expected Discharge: 1-2 days  Disposition: Home when tolerating PO intake. Assessment/Plan:    Intractable N/V:   CT A/P unremarkable. Likely cannabinoid hyperemesis syndrome. Cont capsicin cream, hot showers. Continue PRN antiemetics, IVF. Will trial PRN compazine. Scopolamine patch. Trial clear liquid diet, adv as tolerated. Hypokalemia: 2/2 #1. 3.1 yesterday, was not replaced. Labs pending today. Replacement protocol ordered. Mild lactic acidosis: secondary to #1. Continue IVF. Resolved. Cannabis abuse: daily use. Concern for cannabinoid hyperemesis syndrome. Cessation advised. H/o colitis: per patient. No findings on CT. No indication for abx at this time. Hypertension: noted one time, likely d/t pain. Will monitor. No indication for antihypertensives at this time. Isolated hyperbilirubinemia: likely Gilbert's Syndrome. Outpatient follow up. Chief Complaint: N/V    HPI / Hospital Course: Per HPI: \" Patient presents to the ED with one day of nausea and vomiting with abdominal pain. The patient denies diarrhea but has had loose stools. The patient states she has a history of colitis. There are no fevers, no blood in stool, no shortness of breath, no dysuria. Patient does not smoke cigarettes but does smoke marijuana at least once daily. The patient is found to have hypokalemia in the ED and no acute findings on the CT abd/pelvis. Patient has failed a po trial and will be admitted for further evaluation and treatment of abdominal pain. \"     5/22: Pt continues to have uncontrolled vomiting, has not eaten anything yet today. 5/23: Pt reports continued vomiting. Not tolerating clears. K 3.0.      Subjective (past 24

## 2023-05-24 NOTE — PLAN OF CARE
Problem: Pain  Goal: Verbalizes/displays adequate comfort level or baseline comfort level  5/24/2023 0144 by Tre Elam RN  Outcome: Progressing  5/23/2023 1430 by Rina Alejandre RN  Outcome: Progressing  Flowsheets (Taken 5/23/2023 1430)  Verbalizes/displays adequate comfort level or baseline comfort level:   Encourage patient to monitor pain and request assistance   Assess pain using appropriate pain scale   Administer analgesics based on type and severity of pain and evaluate response   Implement non-pharmacological measures as appropriate and evaluate response     Problem: Safety - Adult  Goal: Free from fall injury  Outcome: Progressing  Flowsheets (Taken 5/24/2023 0144)  Free From Fall Injury:   Instruct family/caregiver on patient safety   Based on caregiver fall risk screen, instruct family/caregiver to ask for assistance with transferring infant if caregiver noted to have fall risk factors     Problem: Gastrointestinal - Adult  Goal: Minimal or absence of nausea and vomiting  5/24/2023 0144 by Tre Elam RN  Outcome: Progressing  Flowsheets (Taken 5/24/2023 0144)  Minimal or absence of nausea and vomiting:   Administer IV fluids as ordered to ensure adequate hydration   Administer ordered antiemetic medications as needed   Provide nonpharmacologic comfort measures as appropriate  5/23/2023 1431 by Rina Alejandre RN  Outcome: Progressing  Flowsheets (Taken 5/23/2023 1431)  Minimal or absence of nausea and vomiting:   Administer IV fluids as ordered to ensure adequate hydration   Administer ordered antiemetic medications as needed  Goal: Maintains or returns to baseline bowel function  Outcome: Progressing  Flowsheets (Taken 5/24/2023 0144)  Maintains or returns to baseline bowel function:   Assess bowel function   Administer IV fluids as ordered to ensure adequate hydration   Administer ordered medications as needed     Problem: Nutrition Deficit:  Goal: Optimize nutritional

## 2023-05-25 LAB
ALBUMIN SERPL BCG-MCNC: 4.2 G/DL (ref 3.5–5.1)
ALP SERPL-CCNC: 87 U/L (ref 38–126)
ALT SERPL W/O P-5'-P-CCNC: 41 U/L (ref 11–66)
ANION GAP SERPL CALC-SCNC: 15 MEQ/L (ref 8–16)
AST SERPL-CCNC: 26 U/L (ref 5–40)
BILIRUB CONJ SERPL-MCNC: 0.3 MG/DL (ref 0–0.3)
BILIRUB SERPL-MCNC: 3.6 MG/DL (ref 0.3–1.2)
BUN SERPL-MCNC: 7 MG/DL (ref 7–22)
CALCIUM SERPL-MCNC: 9.3 MG/DL (ref 8.5–10.5)
CHLORIDE SERPL-SCNC: 101 MEQ/L (ref 98–111)
CO2 SERPL-SCNC: 21 MEQ/L (ref 23–33)
CREAT SERPL-MCNC: 0.6 MG/DL (ref 0.4–1.2)
GFR SERPL CREATININE-BSD FRML MDRD: > 60 ML/MIN/1.73M2
GLUCOSE SERPL-MCNC: 82 MG/DL (ref 70–108)
MAGNESIUM SERPL-MCNC: 2.3 MG/DL (ref 1.6–2.4)
POTASSIUM SERPL-SCNC: 3.9 MEQ/L (ref 3.5–5.2)
PROT SERPL-MCNC: 6.5 G/DL (ref 6.1–8)
SODIUM SERPL-SCNC: 137 MEQ/L (ref 135–145)

## 2023-05-25 PROCEDURE — 1200000000 HC SEMI PRIVATE

## 2023-05-25 PROCEDURE — C9113 INJ PANTOPRAZOLE SODIUM, VIA: HCPCS | Performed by: PHYSICIAN ASSISTANT

## 2023-05-25 PROCEDURE — 36415 COLL VENOUS BLD VENIPUNCTURE: CPT

## 2023-05-25 PROCEDURE — 80053 COMPREHEN METABOLIC PANEL: CPT

## 2023-05-25 PROCEDURE — 2580000003 HC RX 258: Performed by: PHYSICIAN ASSISTANT

## 2023-05-25 PROCEDURE — 6360000002 HC RX W HCPCS: Performed by: NURSE PRACTITIONER

## 2023-05-25 PROCEDURE — 83735 ASSAY OF MAGNESIUM: CPT

## 2023-05-25 PROCEDURE — 6370000000 HC RX 637 (ALT 250 FOR IP): Performed by: PHYSICIAN ASSISTANT

## 2023-05-25 PROCEDURE — 99232 SBSQ HOSP IP/OBS MODERATE 35: CPT | Performed by: PHYSICIAN ASSISTANT

## 2023-05-25 PROCEDURE — 82248 BILIRUBIN DIRECT: CPT

## 2023-05-25 PROCEDURE — 6360000002 HC RX W HCPCS: Performed by: PHYSICIAN ASSISTANT

## 2023-05-25 RX ORDER — IBUPROFEN 200 MG
1 CAPSULE ORAL 2 TIMES DAILY
Status: DISCONTINUED | OUTPATIENT
Start: 2023-05-25 | End: 2023-05-26 | Stop reason: HOSPADM

## 2023-05-25 RX ADMIN — PROCHLORPERAZINE EDISYLATE 5 MG: 5 INJECTION INTRAMUSCULAR; INTRAVENOUS at 19:09

## 2023-05-25 RX ADMIN — SODIUM CHLORIDE, POTASSIUM CHLORIDE, SODIUM LACTATE AND CALCIUM CHLORIDE: 600; 310; 30; 20 INJECTION, SOLUTION INTRAVENOUS at 11:22

## 2023-05-25 RX ADMIN — SODIUM CHLORIDE, POTASSIUM CHLORIDE, SODIUM LACTATE AND CALCIUM CHLORIDE: 600; 310; 30; 20 INJECTION, SOLUTION INTRAVENOUS at 23:06

## 2023-05-25 RX ADMIN — BACITRACIN ZINC NEOMYCIN SULFATE POLYMYXIN B SULFATE 1 G: 400; 3.5; 5 OINTMENT TOPICAL at 19:58

## 2023-05-25 RX ADMIN — DIPHENHYDRAMINE HYDROCHLORIDE 25 MG: 50 INJECTION, SOLUTION INTRAMUSCULAR; INTRAVENOUS at 23:03

## 2023-05-25 RX ADMIN — BACITRACIN ZINC NEOMYCIN SULFATE POLYMYXIN B SULFATE 1 G: 400; 3.5; 5 OINTMENT TOPICAL at 11:18

## 2023-05-25 RX ADMIN — PROCHLORPERAZINE EDISYLATE 5 MG: 5 INJECTION INTRAMUSCULAR; INTRAVENOUS at 12:41

## 2023-05-25 RX ADMIN — SODIUM CHLORIDE, PRESERVATIVE FREE 10 ML: 5 INJECTION INTRAVENOUS at 19:59

## 2023-05-25 RX ADMIN — PANTOPRAZOLE SODIUM 40 MG: 40 INJECTION, POWDER, LYOPHILIZED, FOR SOLUTION INTRAVENOUS at 11:18

## 2023-05-25 ASSESSMENT — PAIN SCALES - GENERAL
PAINLEVEL_OUTOF10: 2
PAINLEVEL_OUTOF10: 0
PAINLEVEL_OUTOF10: 1
PAINLEVEL_OUTOF10: 0

## 2023-05-25 ASSESSMENT — PAIN DESCRIPTION - ORIENTATION: ORIENTATION: LOWER

## 2023-05-25 ASSESSMENT — PAIN DESCRIPTION - PAIN TYPE: TYPE: ACUTE PAIN

## 2023-05-25 ASSESSMENT — PAIN DESCRIPTION - ONSET: ONSET: AWAKENED FROM SLEEP

## 2023-05-25 ASSESSMENT — PAIN DESCRIPTION - DESCRIPTORS: DESCRIPTORS: CRAMPING

## 2023-05-25 ASSESSMENT — PAIN - FUNCTIONAL ASSESSMENT: PAIN_FUNCTIONAL_ASSESSMENT: ACTIVITIES ARE NOT PREVENTED

## 2023-05-25 ASSESSMENT — PAIN DESCRIPTION - LOCATION: LOCATION: ABDOMEN

## 2023-05-25 ASSESSMENT — PAIN DESCRIPTION - FREQUENCY: FREQUENCY: INTERMITTENT

## 2023-05-25 NOTE — PROGRESS NOTES
Hospitalist Progress Note    Patient:  Mihaela Adam    Unit/Bed:5K-11/011-A  YOB: 2005  MRN: [id number]   Acct: [de-identified]   PCP: Ishan Carrillo. JEANNIE Alegre - CNP  Code Status: Full Code  Date of Admission: 5/21/2023      Disposition: Discussed with patient that we will adv her diet today and hopeful for discharge tomorrow morning (5/26). Assessment/Plan:    Intractable N/V:   CT A/P unremarkable. Likely cannabinoid hyperemesis syndrome. Cont capsicin cream, hot showers. Continue PRN antiemetics, IVF. Will trial PRN compazine. Scopolamine patch. Cont PO Zofran. Adv diet as tolerated, has only been taking clears so far. Adv to full at this time. Hypokalemia: 2/2 #1. Replacement protocol ordered. Mild lactic acidosis: secondary to #1. Continue IVF. Resolved. Cannabis abuse: daily use. Concern for cannabinoid hyperemesis syndrome. Cessation advised. H/o colitis: per patient. No findings on CT. No indication for abx at this time. Hypertension: noted one time, likely d/t pain. Will monitor. No indication for antihypertensives at this time. Isolated hyperbilirubinemia: likely Gilbert's Syndrome. Outpatient follow up. Chief Complaint: N/V    HPI / Hospital Course: Per HPI: \" Patient presents to the ED with one day of nausea and vomiting with abdominal pain. The patient denies diarrhea but has had loose stools. The patient states she has a history of colitis. There are no fevers, no blood in stool, no shortness of breath, no dysuria. Patient does not smoke cigarettes but does smoke marijuana at least once daily. The patient is found to have hypokalemia in the ED and no acute findings on the CT abd/pelvis. Patient has failed a po trial and will be admitted for further evaluation and treatment of abdominal pain. \"     5/22: Pt continues to have uncontrolled vomiting, has not eaten anything yet today. 5/23: Pt reports continued vomiting.  Not

## 2023-05-25 NOTE — PROGRESS NOTES
Pt is a 17y.o. female, sitting up in bed on her cellphone, in 5K-11. Minoo is pleasant and talkative. She shared that she has battled emesis on and off for a month, and no one can figure out why. He is about to enjoy her first solid food meal in some time, tonight, and is set to be discharged tomorrow. She shared that she is being set up with a specialist to further investigate the issue, and she is pretty happy about that.  provided active listening, encouragement, nurtured hope and prayer-met with gratitude by Lydia Rosa.     05/25/23 Jeniffer 1980   Encounter Summary   Encounter Overview/Reason  Initial Encounter   Service Provided For: Patient   Referral/Consult From: [address] [place] [address] Family members   Last Encounter  05/25/23   Complexity of Encounter Low   Begin Time 1809   End Time  1819   Total Time Calculated 10 min   Spiritual/Emotional needs   Type Spiritual Support   Assessment/Intervention/Outcome   Assessment Coping;Calm; Hopeful;Peaceful   Intervention Prayer (assurance of)/[place];Nurtured Hope;Explored/Affirmed feelings, thoughts, concerns; Discussed illness injury and its impact; Active listening   Outcome Engaged in conversation;Expressed feelings, needs, and concerns;Expressed Gratitude;Receptive;Coping

## 2023-05-25 NOTE — CARE COORDINATION
5/25/23, 2:41 PM EDT    DISCHARGE ON GOING EVALUATION    Minoo WATTS [facility] day: 2  Location: -11/011-A Reason for admit: Peptic ulcer disease [K27.9]  Intractable nausea and vomiting [R11.2]  Nausea and vomiting, unspecified vomiting type [R11.2]  Intractable vomiting with nausea [R11.2]   Barriers to Discharge: IV fluids, capsaicin cream, IV Protonix, prn Zofran, and Compazine, BMP, LFT's and Magnesium level in a.m., full liquid diet, SCD's, up with assistance. PCP: JEANNIE Floyd - CNP  Readmission Risk Score: 6.1%  Patient Goals/Plan/Treatment Preferences: Minoo is from home with her parents. No needs at discharge.

## 2023-05-25 NOTE — PROGRESS NOTES
IV team to room to attempt IV placement. Patient requested this RN come back in a bit. IV team returned to room. 24g diffusics placed in left hand. Blood return noted and flushed x2 with no pain or discomfort noted.

## 2023-05-25 NOTE — PLAN OF CARE
Problem: Pain  Goal: Verbalizes/displays adequate comfort level or baseline comfort level  5/24/2023 2347 by Jose Roberto Morris RN  Outcome: Progressing  Flowsheets (Taken 5/24/2023 2347)  Verbalizes/displays adequate comfort level or baseline comfort level:   Encourage patient to monitor pain and request assistance   Assess pain using appropriate pain scale   Administer analgesics based on type and severity of pain and evaluate response     Problem: Safety - Adult  Goal: Free from fall injury  5/24/2023 2347 by Jose Roberto Morris RN  Outcome: Progressing  Flowsheets (Taken 5/24/2023 2347)  Free From Fall Injury:   Instruct family/caregiver on patient safety   Based on caregiver fall risk screen, instruct family/caregiver to ask for assistance with transferring infant if caregiver noted to have fall risk factors     Problem: Gastrointestinal - Adult  Goal: Minimal or absence of nausea and vomiting  5/24/2023 2347 by Jose Roberto Morris RN  Outcome: Progressing  Flowsheets (Taken 5/24/2023 2347)  Minimal or absence of nausea and vomiting:   Administer IV fluids as ordered to ensure adequate hydration   Maintain NPO status until nausea and vomiting are resolved     Problem: Nutrition Deficit:  Goal: Optimize nutritional status  5/24/2023 2347 by Jose Roberto Morris RN  Outcome: Progressing  Flowsheets (Taken 5/24/2023 2347)  Nutrient intake appropriate for improving, restoring, or maintaining nutritional needs: Assess nutritional status and recommend course of action     Problem: Discharge Planning  Goal: Discharge to home or other facility with appropriate resources  Outcome: Progressing  Flowsheets (Taken 5/24/2023 2347)  Discharge to home or other facility with appropriate resources:   Identify barriers to discharge with patient and caregiver   Arrange for needed discharge resources and transportation as appropriate   Identify discharge learning needs (meds, wound care, etc)     Problem: Gastrointestinal -

## 2023-05-25 NOTE — PLAN OF CARE
Problem: Pain  Goal: Verbalizes/displays adequate comfort level or baseline comfort level  5/25/2023 1236 by Jennifer Kat RN  Outcome: Progressing  Flowsheets (Taken 5/25/2023 1236)  Verbalizes/displays adequate comfort level or baseline comfort level:   Encourage patient to monitor pain and request assistance   Assess pain using appropriate pain scale   Administer analgesics based on type and severity of pain and evaluate response   Implement non-pharmacological measures as appropriate and evaluate response     Problem: Safety - Adult  Goal: Free from fall injury  5/25/2023 1236 by Jennifer Kat RN  Outcome: Progressing  Flowsheets (Taken 5/25/2023 1236)  Free From Fall Injury:   Instruct family/caregiver on patient safety   Based on caregiver fall risk screen, instruct family/caregiver to ask for assistance with transferring infant if caregiver noted to have fall risk factors     Problem: Gastrointestinal - Adult  Goal: Minimal or absence of nausea and vomiting  5/25/2023 1236 by Jennifer Kat RN  Outcome: Progressing  Flowsheets (Taken 5/25/2023 1236)  Minimal or absence of nausea and vomiting:   Administer IV fluids as ordered to ensure adequate hydration   Maintain NPO status until nausea and vomiting are resolved   Administer ordered antiemetic medications as needed     Problem: Gastrointestinal - Adult  Goal: Maintains or returns to baseline bowel function  Outcome: Progressing  Flowsheets (Taken 5/25/2023 1236)  Maintains or returns to baseline bowel function:   Assess bowel function   Administer IV fluids as ordered to ensure adequate hydration   Encourage oral fluids to ensure adequate hydration     Problem: Skin/Tissue Integrity - Adult  Goal: Skin integrity remains intact  Outcome: Progressing  Flowsheets (Taken 5/25/2023 1236)  Skin Integrity Remains Intact:   Monitor for areas of redness and/or skin breakdown   Assess vascular access sites hourly     Problem: Nutrition Deficit:  Goal:

## 2023-05-25 NOTE — FLOWSHEET NOTE
IV in pt's right hand infiltrated. Resource RN and ED RN attempted and were unsuccessful. Provider was made aware that pt is not receiving IV fluids.

## 2023-05-25 NOTE — PROGRESS NOTES
Patient wanted her diet advanced so she could eat chicken noodle soup. Chicken noodle soup delivered and patient ate it and began having nausea and emesis. Compazine given.

## 2023-05-26 VITALS
SYSTOLIC BLOOD PRESSURE: 132 MMHG | OXYGEN SATURATION: 99 % | BODY MASS INDEX: 27.31 KG/M2 | HEIGHT: 64 IN | WEIGHT: 160 LBS | RESPIRATION RATE: 16 BRPM | HEART RATE: 64 BPM | DIASTOLIC BLOOD PRESSURE: 75 MMHG | TEMPERATURE: 98 F

## 2023-05-26 LAB
ALBUMIN SERPL BCG-MCNC: 4 G/DL (ref 3.5–5.1)
ALP SERPL-CCNC: 88 U/L (ref 38–126)
ALT SERPL W/O P-5'-P-CCNC: 33 U/L (ref 11–66)
ANION GAP SERPL CALC-SCNC: 9 MEQ/L (ref 8–16)
AST SERPL-CCNC: 21 U/L (ref 5–40)
BILIRUB CONJ SERPL-MCNC: 0.3 MG/DL (ref 0–0.3)
BILIRUB SERPL-MCNC: 2.4 MG/DL (ref 0.3–1.2)
BUN SERPL-MCNC: 6 MG/DL (ref 7–22)
CALCIUM SERPL-MCNC: 9.6 MG/DL (ref 8.5–10.5)
CHLORIDE SERPL-SCNC: 105 MEQ/L (ref 98–111)
CO2 SERPL-SCNC: 24 MEQ/L (ref 23–33)
CREAT SERPL-MCNC: 0.6 MG/DL (ref 0.4–1.2)
GFR SERPL CREATININE-BSD FRML MDRD: > 60 ML/MIN/1.73M2
GLUCOSE SERPL-MCNC: 87 MG/DL (ref 70–108)
MAGNESIUM SERPL-MCNC: 2.3 MG/DL (ref 1.6–2.4)
POTASSIUM SERPL-SCNC: 4.6 MEQ/L (ref 3.5–5.2)
PROT SERPL-MCNC: 6.2 G/DL (ref 6.1–8)
SODIUM SERPL-SCNC: 138 MEQ/L (ref 135–145)

## 2023-05-26 PROCEDURE — 2580000003 HC RX 258: Performed by: PHYSICIAN ASSISTANT

## 2023-05-26 PROCEDURE — 6360000002 HC RX W HCPCS: Performed by: PHYSICIAN ASSISTANT

## 2023-05-26 PROCEDURE — 82248 BILIRUBIN DIRECT: CPT

## 2023-05-26 PROCEDURE — 6360000002 HC RX W HCPCS: Performed by: NURSE PRACTITIONER

## 2023-05-26 PROCEDURE — 80053 COMPREHEN METABOLIC PANEL: CPT

## 2023-05-26 PROCEDURE — 6370000000 HC RX 637 (ALT 250 FOR IP): Performed by: PHYSICIAN ASSISTANT

## 2023-05-26 PROCEDURE — 36415 COLL VENOUS BLD VENIPUNCTURE: CPT

## 2023-05-26 PROCEDURE — 83735 ASSAY OF MAGNESIUM: CPT

## 2023-05-26 RX ORDER — CAPSAICIN 0.025 %
CREAM (GRAM) TOPICAL
Qty: 1 EACH | Refills: 1 | Status: SHIPPED | OUTPATIENT
Start: 2023-05-26 | End: 2023-06-25

## 2023-05-26 RX ORDER — PANTOPRAZOLE SODIUM 40 MG/1
40 TABLET, DELAYED RELEASE ORAL
Status: DISCONTINUED | OUTPATIENT
Start: 2023-05-26 | End: 2023-05-26 | Stop reason: HOSPADM

## 2023-05-26 RX ORDER — ONDANSETRON 4 MG/1
4 TABLET, ORALLY DISINTEGRATING ORAL EVERY 8 HOURS PRN
Qty: 20 TABLET | Refills: 0 | Status: SHIPPED | OUTPATIENT
Start: 2023-05-26

## 2023-05-26 RX ORDER — PANTOPRAZOLE SODIUM 40 MG/1
40 TABLET, DELAYED RELEASE ORAL
Qty: 30 TABLET | Refills: 3 | Status: SHIPPED | OUTPATIENT
Start: 2023-05-27

## 2023-05-26 RX ORDER — PROMETHAZINE HYDROCHLORIDE 25 MG/1
25 TABLET ORAL 4 TIMES DAILY PRN
Qty: 12 TABLET | Refills: 0 | Status: SHIPPED | OUTPATIENT
Start: 2023-05-26 | End: 2023-06-02

## 2023-05-26 RX ORDER — DROPERIDOL 2.5 MG/ML
0.62 INJECTION, SOLUTION INTRAMUSCULAR; INTRAVENOUS EVERY 6 HOURS PRN
Status: DISCONTINUED | OUTPATIENT
Start: 2023-05-26 | End: 2023-05-26 | Stop reason: HOSPADM

## 2023-05-26 RX ORDER — SCOLOPAMINE TRANSDERMAL SYSTEM 1 MG/1
1 PATCH, EXTENDED RELEASE TRANSDERMAL
Qty: 3 PATCH | Refills: 0 | Status: SHIPPED | OUTPATIENT
Start: 2023-05-27

## 2023-05-26 RX ADMIN — SODIUM CHLORIDE, POTASSIUM CHLORIDE, SODIUM LACTATE AND CALCIUM CHLORIDE: 600; 310; 30; 20 INJECTION, SOLUTION INTRAVENOUS at 10:46

## 2023-05-26 RX ADMIN — DIPHENHYDRAMINE HYDROCHLORIDE 25 MG: 50 INJECTION, SOLUTION INTRAMUSCULAR; INTRAVENOUS at 06:00

## 2023-05-26 RX ADMIN — PANTOPRAZOLE SODIUM 40 MG: 40 TABLET, DELAYED RELEASE ORAL at 08:56

## 2023-05-26 RX ADMIN — PROCHLORPERAZINE EDISYLATE 5 MG: 5 INJECTION INTRAMUSCULAR; INTRAVENOUS at 09:48

## 2023-05-26 RX ADMIN — DROPERIDOL 0.62 MG: 2.5 INJECTION, SOLUTION INTRAMUSCULAR; INTRAVENOUS at 12:19

## 2023-05-26 RX ADMIN — BACITRACIN ZINC NEOMYCIN SULFATE POLYMYXIN B SULFATE 1 G: 400; 3.5; 5 OINTMENT TOPICAL at 08:40

## 2023-05-26 NOTE — PROGRESS NOTES
Patient discharged home with mom. Patient verbalizes understanding of discharge instructions. Chart tore down and placed in yellow bin.

## 2023-05-26 NOTE — DISCHARGE SUMMARY
medications      prochlorperazine 10 MG tablet  Commonly known as: COMPAZINE            ASK your doctor about these medications      metoclopramide 10 MG tablet  Commonly known as: Reglan  1 tablet every 6-8 hours for nausea vomiting when necessary               Where to Get Your Medications        These medications were sent to [facility] , [address] [facility]  [address], [address] [address]      Phone: [phone]   capsaicin 0.025 % cream  ondansetron 4 MG disintegrating tablet  pantoprazole 40 MG tablet  promethazine 25 MG tablet  scopolamine transdermal patch          Time Spent on discharge is <30 minutes. Thank you Leo Tanner. JEANNIE Huynh CNP for the opportunity to be involved in this patient's care.       Signed:    Electronically signed by Silvestre Higuera PA-C on 5/26/23 at 12:31 PM EDT

## 2023-05-26 NOTE — CARE COORDINATION
5/26/23, 2:52 PM EDT    Patient goals/plan/ treatment preferences discussed by  and . Patient goals/plan/ treatment preferences reviewed with patient/ family. Patient/ family verbalize understanding of discharge plan and are in agreement with goal/plan/treatment preferences. Understanding was demonstrated using the teach back method. AVS provided by RN at time of discharge, which includes all necessary medical information pertaining to the patients current course of illness, treatment, post-discharge goals of care, and treatment preferences.      Services At/After Discharge: None

## 2023-05-26 NOTE — PLAN OF CARE
Problem: Pain  Goal: Verbalizes/displays adequate comfort level or baseline comfort level  5/26/2023 1249 by Alireza Carvalho RN  Outcome: Adequate for Discharge     Problem: Safety - Adult  Goal: Free from fall injury  5/26/2023 1249 by Alireza Carvalho RN  Outcome: Adequate for Discharge     Problem: Gastrointestinal - Adult  Goal: Minimal or absence of nausea and vomiting  5/26/2023 1249 by Alireza Carvalho RN  Outcome: Adequate for Discharge     Problem: Gastrointestinal - Adult  Goal: Maintains or returns to baseline bowel function  5/26/2023 1249 by Alireza Carvalho RN  Outcome: Adequate for Discharge     Problem: Skin/Tissue Integrity - Adult  Goal: Skin integrity remains intact  5/26/2023 1249 by Alireza Carvalho RN  Outcome: Adequate for Discharge     Problem: Nutrition Deficit:  Goal: Optimize nutritional status  5/26/2023 1249 by Alireza Carvalho RN  Outcome: Adequate for Discharge     Problem: Discharge Planning  Goal: Discharge to home or other facility with appropriate resources  5/26/2023 1249 by Alireza Carvalho RN  Outcome: Adequate for Discharge     Problem: Skin/Tissue Integrity  Goal: Absence of new skin breakdown  Description: 1. Monitor for areas of redness and/or skin breakdown  2. Assess vascular access sites hourly  3. Every 4-6 hours minimum:  Change oxygen saturation probe site  4. Every 4-6 hours:  If on nasal continuous positive airway pressure, respiratory therapy assess nares and determine need for appliance change or resting period.   5/26/2023 1249 by Alireza Carvalho RN  Outcome: Adequate for Discharge

## 2023-05-26 NOTE — PLAN OF CARE
Problem: Pain  Goal: Verbalizes/displays adequate comfort level or baseline comfort level  5/26/2023 0229 by Elton Navarro RN  Outcome: Progressing  Flowsheets (Taken 5/25/2023 1956)  Verbalizes/displays adequate comfort level or baseline comfort level:   Encourage patient to monitor pain and request assistance   Assess pain using appropriate pain scale   Implement non-pharmacological measures as appropriate and evaluate response  Pain Assessment: None - Denies Pain  Pain Level: 0   Patient's Stated Pain Goal: 0 - No pain   Is pain goal met at this time? Yes     Non-Pharmaceutical Pain Intervention(s): Rest, Repositioned      Problem: Safety - Adult  Goal: Free from fall injury  5/26/2023 0229 by Elton Navarro RN  Outcome: Progressing  Fall assessment completed. Patient using call light appropriately to call for assistance with ambulation to bathroom. Personal items within reach. Patient is also compliant with use of non-skid slippers. Problem: Gastrointestinal - Adult  Goal: Minimal or absence of nausea and vomiting  5/26/2023 0229 by Elton Navarro RN  Outcome: Progressing  Flowsheets (Taken 5/25/2023 1956)  Minimal or absence of nausea and vomiting:   Administer IV fluids as ordered to ensure adequate hydration   Administer ordered antiemetic medications as needed   Provide nonpharmacologic comfort measures as appropriate   Advance diet as tolerated, if ordered  Patient kept monitored for nausea and bouts of vomiting. Problem: Nutrition Deficit:  Goal: Optimize nutritional status  5/26/2023 0229 by Elton Navarro RN  Outcome: Progressing  On regular diet. With good oral intake.      Problem: Discharge Planning  Goal: Discharge to home or other facility with appropriate resources  5/26/2023 0229 by Elton Navarro RN  Outcome: Progressing  Flowsheets (Taken 5/25/2023 1956)  Discharge to home or other facility with appropriate resources:   Identify barriers to discharge with patient and caregiver   Arrange

## 2023-10-21 ENCOUNTER — HOSPITAL ENCOUNTER (EMERGENCY)
Age: 18
Discharge: HOME | End: 2023-10-21
Attending: FAMILY MEDICINE
Payer: MEDICAID

## 2023-10-21 VITALS
RESPIRATION RATE: 18 BRPM | TEMPERATURE: 99.2 F | HEIGHT: 64 IN | WEIGHT: 155 LBS | OXYGEN SATURATION: 99 % | HEART RATE: 90 BPM | BODY MASS INDEX: 26.46 KG/M2 | SYSTOLIC BLOOD PRESSURE: 115 MMHG | DIASTOLIC BLOOD PRESSURE: 56 MMHG

## 2023-10-21 DIAGNOSIS — G44.209 TENSION HEADACHE: Primary | ICD-10-CM

## 2023-10-21 DIAGNOSIS — O26.92 COMPLICATION OF PREGNANCY IN SECOND TRIMESTER: ICD-10-CM

## 2023-10-21 LAB
ANION GAP SERPL CALC-SCNC: 16 MEQ/L (ref 8–16)
BASOPHILS ABSOLUTE: 0 THOU/MM3 (ref 0–0.1)
BASOPHILS NFR BLD AUTO: 0.2 %
BILIRUB UR QL STRIP.AUTO: NEGATIVE
BUN SERPL-MCNC: 4 MG/DL (ref 7–22)
CALCIUM SERPL-MCNC: 8.8 MG/DL (ref 8.5–10.5)
CHARACTER UR: CLEAR
CHLORIDE SERPL-SCNC: 103 MEQ/L (ref 98–111)
CO2 SERPL-SCNC: 16 MEQ/L (ref 23–33)
COLOR: YELLOW
CREAT SERPL-MCNC: 0.4 MG/DL (ref 0.4–1.2)
DEPRECATED RDW RBC AUTO: 39.9 FL (ref 35–45)
EOSINOPHIL NFR BLD AUTO: 0.1 %
EOSINOPHILS ABSOLUTE: 0 THOU/MM3 (ref 0–0.4)
ERYTHROCYTE [DISTWIDTH] IN BLOOD BY AUTOMATED COUNT: 12.5 % (ref 11.5–14.5)
FLUAV RNA RESP QL NAA+PROBE: NOT DETECTED
FLUBV RNA RESP QL NAA+PROBE: NOT DETECTED
GFR SERPL CREATININE-BSD FRML MDRD: > 60 ML/MIN/1.73M2
GLUCOSE SERPL-MCNC: 63 MG/DL (ref 70–108)
GLUCOSE UR QL STRIP.AUTO: NEGATIVE MG/DL
HCG INTACT+B SERPL-ACNC: ABNORMAL MIU/ML (ref 0–5)
HCT VFR BLD AUTO: 36.3 % (ref 37–47)
HGB BLD-MCNC: 12.3 GM/DL (ref 12–16)
HGB UR QL STRIP.AUTO: NEGATIVE
IMM GRANULOCYTES # BLD AUTO: 0.03 THOU/MM3 (ref 0–0.07)
IMM GRANULOCYTES NFR BLD AUTO: 0.3 %
KETONES UR QL STRIP.AUTO: NEGATIVE
LYMPHOCYTES ABSOLUTE: 1 THOU/MM3 (ref 1–4.8)
LYMPHOCYTES NFR BLD AUTO: 9.3 %
MCH RBC QN AUTO: 29.7 PG (ref 26–33)
MCHC RBC AUTO-ENTMCNC: 33.9 GM/DL (ref 32.2–35.5)
MCV RBC AUTO: 87.7 FL (ref 81–99)
MONOCYTES ABSOLUTE: 0.5 THOU/MM3 (ref 0.4–1.3)
MONOCYTES NFR BLD AUTO: 5.2 %
NEUTROPHILS NFR BLD AUTO: 84.9 %
NITRITE UR QL STRIP: NEGATIVE
NRBC BLD AUTO-RTO: 0 /100 WBC
OSMOLALITY SERPL CALC.SUM OF ELEC: 265 MOSMOL/KG (ref 275–300)
PH UR STRIP.AUTO: 7 [PH] (ref 5–9)
PLATELET # BLD AUTO: 312 THOU/MM3 (ref 130–400)
PMV BLD AUTO: 10.6 FL (ref 9.4–12.4)
POTASSIUM SERPL-SCNC: 3.6 MEQ/L (ref 3.5–5.2)
PROT UR STRIP.AUTO-MCNC: NEGATIVE MG/DL
RBC # BLD AUTO: 4.14 MILL/MM3 (ref 4.2–5.4)
REASON FOR REJECTION: NORMAL
REJECTED TEST: NORMAL
SARS-COV-2 RNA RESP QL NAA+PROBE: NOT DETECTED
SEGMENTED NEUTROPHILS ABSOLUTE COUNT: 8.7 THOU/MM3 (ref 1.8–7.7)
SODIUM SERPL-SCNC: 135 MEQ/L (ref 135–145)
SP GR UR REFRACT.AUTO: 1.01 (ref 1–1.03)
UROBILINOGEN, URINE: 0.2 EU/DL (ref 0–1)
WBC # BLD AUTO: 10.3 THOU/MM3 (ref 4.8–10.8)
WBC #/AREA URNS HPF: NEGATIVE /[HPF]

## 2023-10-21 PROCEDURE — 6360000002 HC RX W HCPCS: Performed by: FAMILY MEDICINE

## 2023-10-21 PROCEDURE — 96374 THER/PROPH/DIAG INJ IV PUSH: CPT

## 2023-10-21 PROCEDURE — 96375 TX/PRO/DX INJ NEW DRUG ADDON: CPT

## 2023-10-21 PROCEDURE — 36415 COLL VENOUS BLD VENIPUNCTURE: CPT

## 2023-10-21 PROCEDURE — 99284 EMERGENCY DEPT VISIT MOD MDM: CPT

## 2023-10-21 PROCEDURE — 2580000003 HC RX 258: Performed by: FAMILY MEDICINE

## 2023-10-21 PROCEDURE — 85025 COMPLETE CBC W/AUTO DIFF WBC: CPT

## 2023-10-21 PROCEDURE — 87636 SARSCOV2 & INF A&B AMP PRB: CPT

## 2023-10-21 PROCEDURE — 84702 CHORIONIC GONADOTROPIN TEST: CPT

## 2023-10-21 PROCEDURE — 80048 BASIC METABOLIC PNL TOTAL CA: CPT

## 2023-10-21 PROCEDURE — 81003 URINALYSIS AUTO W/O SCOPE: CPT

## 2023-10-21 RX ORDER — PROCHLORPERAZINE EDISYLATE 5 MG/ML
10 INJECTION INTRAMUSCULAR; INTRAVENOUS ONCE
Status: COMPLETED | OUTPATIENT
Start: 2023-10-21 | End: 2023-10-21

## 2023-10-21 RX ORDER — DIPHENHYDRAMINE HYDROCHLORIDE 50 MG/ML
25 INJECTION INTRAMUSCULAR; INTRAVENOUS ONCE
Status: COMPLETED | OUTPATIENT
Start: 2023-10-21 | End: 2023-10-21

## 2023-10-21 RX ORDER — 0.9 % SODIUM CHLORIDE 0.9 %
1000 INTRAVENOUS SOLUTION INTRAVENOUS ONCE
Status: COMPLETED | OUTPATIENT
Start: 2023-10-21 | End: 2023-10-21

## 2023-10-21 RX ADMIN — PROCHLORPERAZINE EDISYLATE 10 MG: 5 INJECTION INTRAMUSCULAR; INTRAVENOUS at 12:40

## 2023-10-21 RX ADMIN — DIPHENHYDRAMINE HYDROCHLORIDE 25 MG: 50 INJECTION INTRAMUSCULAR; INTRAVENOUS at 12:40

## 2023-10-21 RX ADMIN — SODIUM CHLORIDE 1000 ML: 9 INJECTION, SOLUTION INTRAVENOUS at 12:39

## 2023-10-21 ASSESSMENT — PAIN SCALES - GENERAL
PAINLEVEL_OUTOF10: 7
PAINLEVEL_OUTOF10: 4
PAINLEVEL_OUTOF10: 7

## 2023-10-21 ASSESSMENT — PAIN DESCRIPTION - LOCATION: LOCATION: HEAD

## 2023-10-21 ASSESSMENT — PAIN - FUNCTIONAL ASSESSMENT
PAIN_FUNCTIONAL_ASSESSMENT: 0-10

## 2023-10-21 NOTE — ED PROVIDER NOTES
Tomer Kumar is a 19 y.o. female  12 weeks pregnant, a pt of Dr. Heaven Porras, who presents with gradual onset of a headache since the onset last night, at rest. It is not thunderclap or maximal on onset. The quality of the headache is migraine like, though she never was diagnosed with a migraine. This headache is not the worst headache of the patient's life. The patient has associated photophobia, but denies any vision loss, fever, neck stiffness. She denies any abdominal pain but has maria fernanda suprapubic pressure without dysuria or hematuria. She denies antecedent trauma, fever, neck stiffness and rash. All other systems reviewed and are negative. PAST MEDICAL & SURGICAL HISTORY   Past Medical History:   Diagnosis Date    Abdominal pain       History reviewed. No pertinent surgical history.    CURRENT MEDICATIONS   Current Outpatient Rx   Medication Sig Dispense Refill    ondansetron (ZOFRAN-ODT) 4 MG disintegrating tablet Take 1 tablet by mouth every 8 hours as needed for Nausea or Vomiting 20 tablet 0    scopolamine (TRANSDERM-SCOP) transdermal patch Place 1 patch onto the skin every 72 hours 3 patch 0    pantoprazole (PROTONIX) 40 MG tablet Take 1 tablet by mouth every morning (before breakfast) 30 tablet 3    metoclopramide (REGLAN) 10 MG tablet 1 tablet every 6-8 hours for nausea vomiting when necessary (Patient not taking: Reported on 2023) 15 tablet 0    potassium chloride (KLOR-CON M) 20 MEQ extended release tablet Take 1 tablet by mouth 2 times daily for 3 days 6 tablet 0      ALLERGIES   No Known Allergies   SOCIAL & FAMILY HISTORY   Social History     Socioeconomic History    Marital status: Single     Spouse name: None    Number of children: None    Years of education: None    Highest education level: None   Tobacco Use    Smoking status: Never    Smokeless tobacco: Never   Substance and Sexual Activity    Alcohol use: Never

## 2023-10-21 NOTE — ED TRIAGE NOTES
Pt presents to the ER with c/o a headache since last night. Pt reports being 14 weeks pregnant with her first pregnancy. Pt denies hx of headaches or migraines. Pt states she took tylenol last night. Pt denies abdominal cramping, vaginal bleeding or abnormal discharge.  Pt is alert, vss

## 2023-10-21 NOTE — ED NOTES
Pt ambulated to bathroom by self for urine specimen, tolerated well. Pt reports relief of headache with medications. No needs expressed at this time.  ROSIO Ansari Aas, [place]  10/21/23 2422

## 2023-10-21 NOTE — ED NOTES
Pt updated on POC. Pt medicated per MAR. Pt requesting a covid swab, obtained and sent. No needs expressed at this time. Respirations even and unlabored, call light within reach.  JOAN Rebolledos Gaucher, [place]  10/21/23 9701

## 2023-10-21 NOTE — DISCHARGE INSTRUCTIONS
CONTINUE TO PUSH FLUIDS. REST UP. YOUR URINE SAMPLE WAS CLEAN. FOLLOW UP WITH YOUR OB AS SCHEDULED NEXT.

## 2024-01-09 ENCOUNTER — HOSPITAL ENCOUNTER (EMERGENCY)
Age: 19
Discharge: HOME | End: 2024-01-09
Payer: COMMERCIAL

## 2024-01-09 VITALS
BODY MASS INDEX: 29.52 KG/M2 | HEART RATE: 86 BPM | TEMPERATURE: 98 F | RESPIRATION RATE: 20 BRPM | WEIGHT: 172 LBS | DIASTOLIC BLOOD PRESSURE: 70 MMHG | SYSTOLIC BLOOD PRESSURE: 114 MMHG | OXYGEN SATURATION: 100 %

## 2024-01-09 DIAGNOSIS — N76.0 ABSCESS OF VAGINA: ICD-10-CM

## 2024-01-09 DIAGNOSIS — Z3A.26 26 WEEKS GESTATION OF PREGNANCY: Primary | ICD-10-CM

## 2024-01-09 PROCEDURE — 10060 I&D ABSCESS SIMPLE/SINGLE: CPT | Performed by: NURSE PRACTITIONER

## 2024-01-09 PROCEDURE — 99213 OFFICE O/P EST LOW 20 MIN: CPT

## 2024-01-09 PROCEDURE — 99213 OFFICE O/P EST LOW 20 MIN: CPT | Performed by: NURSE PRACTITIONER

## 2024-01-09 PROCEDURE — 87205 SMEAR GRAM STAIN: CPT

## 2024-01-09 PROCEDURE — 87147 CULTURE TYPE IMMUNOLOGIC: CPT

## 2024-01-09 PROCEDURE — 87070 CULTURE OTHR SPECIMN AEROBIC: CPT

## 2024-01-09 RX ORDER — AMOXICILLIN 500 MG/1
500 CAPSULE ORAL 2 TIMES DAILY
Qty: 20 CAPSULE | Refills: 0 | Status: SHIPPED | OUTPATIENT
Start: 2024-01-09 | End: 2024-01-19

## 2024-01-09 ASSESSMENT — PAIN DESCRIPTION - DESCRIPTORS: DESCRIPTORS: ACHING

## 2024-01-09 ASSESSMENT — ENCOUNTER SYMPTOMS
EYE REDNESS: 0
VOMITING: 0
SORE THROAT: 0
NAUSEA: 0
COUGH: 0
EYE DISCHARGE: 0
RHINORRHEA: 0
SHORTNESS OF BREATH: 0
TROUBLE SWALLOWING: 0
DIARRHEA: 0

## 2024-01-09 ASSESSMENT — PAIN DESCRIPTION - FREQUENCY: FREQUENCY: CONTINUOUS

## 2024-01-09 ASSESSMENT — PAIN - FUNCTIONAL ASSESSMENT
PAIN_FUNCTIONAL_ASSESSMENT: 0-10
PAIN_FUNCTIONAL_ASSESSMENT: ACTIVITIES ARE NOT PREVENTED

## 2024-01-09 ASSESSMENT — PAIN DESCRIPTION - LOCATION: LOCATION: LEG

## 2024-01-09 ASSESSMENT — PAIN DESCRIPTION - PAIN TYPE: TYPE: ACUTE PAIN

## 2024-01-09 ASSESSMENT — PAIN SCALES - GENERAL: PAINLEVEL_OUTOF10: 9

## 2024-01-09 ASSESSMENT — PAIN DESCRIPTION - ORIENTATION: ORIENTATION: RIGHT;UPPER;INNER

## 2024-01-09 NOTE — ED TRIAGE NOTES
Patient to room with c/o cyst to right, upper, inner thigh beginning two days ago. Denies drainage.

## 2024-01-09 NOTE — ED PROVIDER NOTES
Skin:     General: Skin is warm and dry.      Capillary Refill: Capillary refill takes less than 2 seconds.   Neurological:      General: No focal deficit present.      Mental Status: She is alert and oriented to person, place, and time.   Psychiatric:         Mood and Affect: Mood normal.         Behavior: Behavior normal.         Thought Content: Thought content normal.         Judgment: Judgment normal.         DIAGNOSTIC RESULTS   Labs:No results found for this visit on 01/09/24.    IMAGING:  No orders to display      URGENT CARE COURSE:     Vitals:    01/09/24 1731 01/09/24 1732   BP:  114/70   Pulse: 86    Resp: 20    Temp: 98 °F (36.7 °C)    TempSrc: Temporal    SpO2: 100%    Weight: 78 kg (172 lb)        Medications - No data to display  PROCEDURES:  Incision/Drainage    Date/Time: 1/9/2024 6:15 PM    Performed by: Jennifer Salas APRN - CNP  Authorized by: Jennifer Salas APRN - CNP    Consent:     Consent obtained:  Verbal    Consent given by:  Patient    Risks, benefits, and alternatives were discussed: yes      Risks discussed:  Bleeding, incomplete drainage, pain, damage to other organs and infection    Alternatives discussed:  No treatment, delayed treatment, alternative treatment, referral and observation  Universal protocol:     Procedure explained and questions answered to patient or proxy's satisfaction: yes      Site/side marked: yes      Immediately prior to procedure, a time out was called: yes      Patient identity confirmed:  Verbally with patient and arm band  Location:     Type:  Abscess    Size:  2 cm    Location:  Anogenital    Anogenital location:  Perineum  Pre-procedure details:     Skin preparation:  Antiseptic wash and povidone-iodine  Anesthesia:     Anesthesia method:  Local infiltration    Local anesthetic:  Lidocaine 1% w/o epi  Procedure type:     Complexity:  Simple  Procedure details:     Ultrasound guidance: no      Needle aspiration: no      Incision types:   Stab incision    Incision depth:  Dermal    Wound management:  Probed and deloculated and irrigated with saline    Drainage:  Bloody and purulent    Drainage amount:  Copious    Wound treatment:  Wound left open    Packing materials:  None  Post-procedure details:     Procedure completion:  Tolerated well, no immediate complications      FINAL IMPRESSION      1. 26 weeks gestation of pregnancy    2. Abscess of vagina        DISPOSITION/PLAN   DISPOSITION Decision To Discharge 01/09/2024 06:16:23 PM    The results of pertinent diagnostic studies and exam findings were discussed with patient/patient representative.   Shared decision-making was performed and patient/patient representative are agreeable that they are suitable for discharge at this time.  The patient’s provisional diagnosis and plan of care were discussed with the patient/patient representative who expressed understanding.  The patient/representative is given strict written and verbal instructions about care at home, including over-the-counter management, prescription information, follow-up, and signs and symptoms of worsening of condition, and the patient/patient representative did verbalize understanding of these instructions.  Patient will go to nearest emergency department if symptoms change or worsen, or for any sign or symptom deemed emergent by the patient or family members. Follow up as an outpatient with PCP within the next 3 days, or sooner if symptoms warrant.     PATIENT REFERRED TO:  Lise Mcnally MD  [address] [address]  [phone]    In 1 week  If symptoms worsen, For wound re-check    DISCHARGE MEDICATIONS:  Discharge Medication List as of 1/9/2024  6:18 PM        START taking these medications    Details   amoxicillin (AMOXIL) 500 MG capsule Take 1 capsule by mouth 2 times daily for 10 days, Disp-20 capsule, R-0Normal           Discharge Medication List as of 1/9/2024  6:18 PM        Please note that some

## 2024-01-09 NOTE — DISCHARGE INSTRUCTIONS
Apply hot compresses with a warm wash cloth for 10-15 minutes twice daily and if area comes to a head, then return to have this drained.  You may use Tylenol or Motrin as needed for pain or fever.  Please finish antibiotics in its entirety.  Seek immediate medical treatment for fever >101.5 for >3 days, increased pain, redness, or swelling of site, or for other worrisome symptoms.

## 2024-01-11 LAB
BACTERIA SPEC AEROBE CULT: NORMAL
GRAM STN SPEC: NORMAL

## 2024-01-13 ENCOUNTER — HOSPITAL ENCOUNTER (OUTPATIENT)
Age: 19
Discharge: HOME | End: 2024-01-13
Attending: OBSTETRICS & GYNECOLOGY | Admitting: OBSTETRICS & GYNECOLOGY
Payer: COMMERCIAL

## 2024-01-13 VITALS
OXYGEN SATURATION: 100 % | SYSTOLIC BLOOD PRESSURE: 113 MMHG | BODY MASS INDEX: 29.37 KG/M2 | DIASTOLIC BLOOD PRESSURE: 66 MMHG | WEIGHT: 172 LBS | HEART RATE: 86 BPM | RESPIRATION RATE: 16 BRPM | TEMPERATURE: 97.5 F | HEIGHT: 64 IN
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
## 2024-01-13 LAB
ABO: NORMAL
ANTIBODY SCREEN: NORMAL
RH FACTOR: NORMAL

## 2024-01-13 PROCEDURE — 96361 HYDRATE IV INFUSION ADD-ON: CPT

## 2024-01-13 PROCEDURE — 2580000003 HC RX 258: Performed by: OBSTETRICS & GYNECOLOGY

## 2024-01-13 PROCEDURE — 86901 BLOOD TYPING SEROLOGIC RH(D): CPT

## 2024-01-13 PROCEDURE — 86900 BLOOD TYPING SEROLOGIC ABO: CPT

## 2024-01-13 PROCEDURE — 96360 HYDRATION IV INFUSION INIT: CPT

## 2024-01-13 PROCEDURE — 6370000000 HC RX 637 (ALT 250 FOR IP): Performed by: OBSTETRICS & GYNECOLOGY

## 2024-01-13 PROCEDURE — 86850 RBC ANTIBODY SCREEN: CPT

## 2024-01-13 PROCEDURE — 96366 THER/PROPH/DIAG IV INF ADDON: CPT

## 2024-01-13 RX ORDER — ACETAMINOPHEN 500 MG
1000 TABLET ORAL ONCE
Status: COMPLETED | OUTPATIENT
Start: 2024-01-13 | End: 2024-01-13

## 2024-01-13 RX ORDER — SODIUM CHLORIDE, SODIUM LACTATE, POTASSIUM CHLORIDE, CALCIUM CHLORIDE 600; 310; 30; 20 MG/100ML; MG/100ML; MG/100ML; MG/100ML
INJECTION, SOLUTION INTRAVENOUS CONTINUOUS
Status: DISCONTINUED | OUTPATIENT
Start: 2024-01-13 | End: 2024-01-13 | Stop reason: HOSPADM

## 2024-01-13 RX ORDER — SODIUM CHLORIDE, SODIUM LACTATE, POTASSIUM CHLORIDE, AND CALCIUM CHLORIDE .6; .31; .03; .02 G/100ML; G/100ML; G/100ML; G/100ML
1000 INJECTION, SOLUTION INTRAVENOUS ONCE
Status: COMPLETED | OUTPATIENT
Start: 2024-01-13 | End: 2024-01-13

## 2024-01-13 RX ADMIN — SODIUM CHLORIDE, POTASSIUM CHLORIDE, SODIUM LACTATE AND CALCIUM CHLORIDE: 600; 310; 30; 20 INJECTION, SOLUTION INTRAVENOUS at 03:10

## 2024-01-13 RX ADMIN — ACETAMINOPHEN 1000 MG: 500 TABLET ORAL at 02:25

## 2024-01-13 RX ADMIN — SODIUM CHLORIDE, POTASSIUM CHLORIDE, SODIUM LACTATE AND CALCIUM CHLORIDE 1000 ML: 600; 310; 30; 20 INJECTION, SOLUTION INTRAVENOUS at 02:08

## 2024-01-13 ASSESSMENT — PAIN SCALES - GENERAL: PAINLEVEL_OUTOF10: 8

## 2024-01-13 ASSESSMENT — PAIN DESCRIPTION - LOCATION: LOCATION: BACK

## 2024-01-13 ASSESSMENT — PAIN DESCRIPTION - DESCRIPTORS: DESCRIPTORS: SORE

## 2024-01-13 ASSESSMENT — PAIN DESCRIPTION - ORIENTATION: ORIENTATION: LOWER

## 2024-01-13 NOTE — PLAN OF CARE
Problem: Pain  Goal: Verbalizes/displays adequate comfort level or baseline comfort level  Outcome: Progressing  Flowsheets (Taken 2024)  Verbalizes/displays adequate comfort level or baseline comfort level:   Encourage patient to monitor pain and request assistance   Administer analgesics based on type and severity of pain and evaluate response   Assess pain using appropriate pain scale   Implement non-pharmacological measures as appropriate and evaluate response  Note: Tylenol given per MAR     Problem: Vaginal Birth or  Section  Goal: Fetal and maternal status remain reassuring during the birth process  Description:  Birth OB-Pregnancy care plan goal which identifies if the fetal and maternal status remain reassuring during the birth process  Outcome: Progressing  Flowsheets (Taken 2024)  Fetal and Maternal Status Remain Reassuring During the Birth Process:   Monitor vital signs   Monitor labor progression (Vaginal delivery)   Monitor fetal heart rate   Monitor uterine activity     Problem: Safety - Adult  Goal: Free from fall injury  Outcome: Progressing  Flowsheets (Taken 2024)  Free From Fall Injury: Instruct family/caregiver on patient safety     Problem: Discharge Planning  Goal: Discharge to home or other facility with appropriate resources  Outcome: Progressing  Flowsheets (Taken 2024)  Discharge to home or other facility with appropriate resources: Identify barriers to discharge with patient and caregiver  Note: Pt plans to return home upon discharge. Pt denies any needs at this time         Problem: Chronic Conditions and Co-morbidities  Goal: Patient's chronic conditions and co-morbidity symptoms are monitored and maintained or improved  Outcome: Progressing  Flowsheets (Taken 2024)  Care Plan - Patient's Chronic Conditions and Co-Morbidity Symptoms are Monitored and Maintained or Improved: Monitor and assess patient's chronic conditions and

## 2024-01-13 NOTE — FLOWSHEET NOTE
Stollings adjusted this time by RN. RN palpated abdomen, abdomen soft and non tender. US adjusted. Audible FM heard by RN and pt verbalizes FM. Oral hydration provided to pt.

## 2024-01-13 NOTE — FLOWSHEET NOTE
RN at bedside. Pt denies any abdominal cramping. Pt states her back is still sore, but just when she is moving. Pt able to go ambulate to bathroom without assistance from nurse. Pt denies any other needs at this time

## 2024-01-13 NOTE — DISCHARGE INSTRUCTIONS
Home Undelivered Discharge Instructions    After Discharge Orders:           Diet: regular diet   Increase fluid intake to 8-10 glasses of water daily    Rest: normal activity as tolerated    Other instructions: Do kick counts once a day on your baby. Choose the time of day your baby is most active. Get in a comfortable lying or sitting position and time how long it takes to feel 10 kicks, twists, turns, swishes, or rolls.     Call Your Doctor if Any of the Following Occurs   Leaking fluid from vagina with or without contractions  Bright red vaginal bleeding occurs that is as heavy as or heavier than a period  Regular contractions are longer, stronger, and closer together  Noticeable decreased fetal movement  Elevated temperature >100.5°F and chills  Blurred vision, spots before eyes, unrelievable headache, severe facial swelling, or upper abdominal pain  Contact physician or hospital OB unit if signs of premature labor occur at ?36 weeks  Persistent or rhythmic low back pain that feels different than you are used to  Menstrual like cramps  Intestinal cramps with or without diarrhea  Pelvic pressure or rhythmic tightening that feels different than you are used to  Watery discharge or a gush of fluid from your vagina  Vaginal bleeding as heavy as a period  General Information     Difference between:  False Labor True Labor   1. Contractions often are irregular and don't consistently get closer together (called Fransisco-Lopez contractions) 1. Contractions come at regular intervals and, as time goes on, get closer together.   2. Contractions may often stop when you rest or with a position change. 2. Contractions continue despite movement or walking. Contractions get stronger and closer together with time.   3. Often felt in the lower abdomen. 3. Usually felt in back coming around to the front.     Reminder to Patient   Please bring all teaching sheets and discharge information with you if you return to the hospital or

## 2024-01-13 NOTE — FLOWSHEET NOTE
Dr Silver called unit. Notified of admission of pt of Dr Mcnally,  @ 26+2 wks, here for c/o lower back pain from fall. Pt denies any abdominal cramping, leaking of fluid, or vaginal bleeding. Pt reports FM and audible FM heard by RN. EFM is reactive for 26 wks, 1 variable decel noted. 2 contractions noted on monitor when pt first got here. Irritability also noted on monitor. Pts abdomen is soft to palpation and non tender. Pt rates pain an 8/10 in her lower back. Pt states the pain is continuous and gets more sore once she is moving. Pt is laying in bed with her eyes closed at this time. Dr Silver stated to keep pt for 4 hours of fetal monitoring from when monitoring began. See new orders.

## 2024-01-13 NOTE — FLOWSHEET NOTE
Dr Silver called unit for update. EFM reactive, pt was still having occasional contractions after IV fluid bolus, but after pt used the bathroom only irritability noted. Pt denies any abdominal cramping. Pt states back is still sore, but feels better than when she came in. Pt was able to rest throughout the night. SVE was closed. Pts blood type is O+. Orders rec'd for discharge.

## 2024-01-13 NOTE — FLOWSHEET NOTE
Pt of Dr Mcnally,  @ 26+2 wks, here for c/o fall that happened around 1600. Pt states that she fell on her L side and does not believe that she hit her abdomen. Pt denies any contractions, leaking of fluid or vaginal bleeding. Pt reports +FM. Pt states that her lower back has pain when she moves and feels like it is sore. Pt rates pain an 8/10 at this time. Pt denies taking any tylenol for the pain. Pt to the bathroom to change into gown and void.

## 2024-01-13 NOTE — FLOWSHEET NOTE
01/13/24 0512   AVS Reviewed   AVS & discharge instructions reviewed with patient and/or representative? Yes   Reviewed instructions with Patient   Level of Understanding Questions answered;Teach back completed;Verbalized understanding     Discharge instructions given and reviewed with patient. Informed pt to notify physician or to come back to L&D if leaking fluid from vagina, with or without contractions. Bright red vaginal bleeding occurs that is as heavy or/as heavier period. Regular contractions that are 2-5 mins apart, that are longer stronger and closer together. Decreased FM. Elevated temperature >100.5 degree F and chills. Blurred vision, spots before eyes, unrelieved headache, severe facial swelling or upper abdominal pain. Questions denied.

## 2024-01-17 NOTE — PROCEDURES
Department of Obstetrics and Gynecology  Labor and Delivery  NST Triage Note      Pt Name: Minoo Jaffe  MRN: [id number] Acct #: [id number]  YOB: 2005  Procedure Performed By: Rissa Silver MD, MD        SUBJECTIVE: Patient presented at 26+2 weeks complaining of falling. + fetal movement.   denies contraction, vaginal bleeding and  leaking fluid.     OBJECTIVE    Vitals:  /66   Pulse 86   Temp 97.5 °F (36.4 °C) (Temporal)   Resp 16   Ht 1.626 m (5' 4\")   Wt 78 kg (172 lb)   SpO2 100%   BMI 29.52 kg/m²     Fetal heart rate:         Baseline Heart Rate:  130        Accelerations:  10 x 10 present       Decelerations:  absent       Variability:  moderate    Contraction frequency: none    The NST was reactive.    ASSESSMENT & PLAN:  Patient was monitored for 4 hours. No signs of chorio, labor and abruption. Reassurance provided. Labor precautions given. Discharged to home in a stable condition and instructed to follow up at her next scheduled appointment.    Rissa Silver MD 1/17/2024 7:09 AM

## 2024-03-05 ENCOUNTER — HOSPITAL ENCOUNTER (EMERGENCY)
Age: 19
Discharge: HOME | End: 2024-03-05
Payer: COMMERCIAL

## 2024-03-05 VITALS
HEART RATE: 107 BPM | DIASTOLIC BLOOD PRESSURE: 66 MMHG | WEIGHT: 177 LBS | RESPIRATION RATE: 20 BRPM | BODY MASS INDEX: 30.38 KG/M2 | TEMPERATURE: 98.7 F | SYSTOLIC BLOOD PRESSURE: 102 MMHG | OXYGEN SATURATION: 98 %

## 2024-03-05 DIAGNOSIS — J11.1 INFLUENZA-LIKE ILLNESS: Primary | ICD-10-CM

## 2024-03-05 LAB
FLUAV AG SPEC QL: NEGATIVE
FLUAV RNA RESP QL NAA+PROBE: NOT DETECTED
FLUBV AG SPEC QL: NEGATIVE
FLUBV RNA RESP QL NAA+PROBE: NOT DETECTED
SARS-COV-2 RNA RESP QL NAA+PROBE: NOT DETECTED

## 2024-03-05 PROCEDURE — 99213 OFFICE O/P EST LOW 20 MIN: CPT | Performed by: NURSE PRACTITIONER

## 2024-03-05 PROCEDURE — 87636 SARSCOV2 & INF A&B AMP PRB: CPT

## 2024-03-05 PROCEDURE — 99213 OFFICE O/P EST LOW 20 MIN: CPT

## 2024-03-05 PROCEDURE — 87804 INFLUENZA ASSAY W/OPTIC: CPT

## 2024-03-05 RX ORDER — ONDANSETRON 4 MG/1
4 TABLET, ORALLY DISINTEGRATING ORAL 3 TIMES DAILY PRN
Qty: 21 TABLET | Refills: 0 | Status: SHIPPED | OUTPATIENT
Start: 2024-03-05

## 2024-03-05 ASSESSMENT — ENCOUNTER SYMPTOMS
VOMITING: 1
NAUSEA: 1

## 2024-03-05 ASSESSMENT — PAIN - FUNCTIONAL ASSESSMENT: PAIN_FUNCTIONAL_ASSESSMENT: NONE - DENIES PAIN

## 2024-03-05 NOTE — DISCHARGE INSTRUCTIONS
We will call you with your COVID influenza results.  Continue to rest, drink plenty of fluids, take Tylenol as needed for any fever, body aches, chills.

## 2024-03-05 NOTE — DISCHARGE INSTR - COC
Continuity of Care Form    Patient Name: Minoo Jaffe   :  2005  MRN:  [id number]    Admit date:  3/5/2024  Discharge date:  ***    Code Status Order: Prior   Advance Directives:     Admitting Physician:  No admitting provider for patient encounter.  PCP: No primary care provider on file.    Discharging Nurse: ***  Discharging Hospital Unit/Room#:   Discharging Unit Phone Number: ***    Emergency Contact:   Extended Emergency Contact Information  Primary Emergency Contact: Hermelinda Zheng   [facility]  Home Phone: [phone]  Mobile Phone: [phone]  Relation: Parent  Secondary Emergency Contact: Mainor Sher   [facility]  Home Phone: [phone]  Relation: Other    Past Surgical History:  Past Surgical History:   Procedure Laterality Date    CHOLECYSTECTOMY  2021       Immunization History:   There is no immunization history for the selected administration types on file for this patient.    Active Problems:  Patient Active Problem List   Diagnosis Code    Intractable nausea and vomiting R11.2    Intractable vomiting with nausea R11.2    Fall due to wet surface, initial encounter W01.0XXA       Isolation/Infection:   Isolation            No Isolation          Patient Infection Status       Infection Onset Added Last Indicated Last Indicated By Review Planned Expiration Resolved Resolved By    COVID-19 (Rule Out) 24 COVID-19 & Influenza Combo (Ordered) 03/15/24 03/19/24                         Nurse Assessment:  Last Vital Signs: /66   Pulse (!) 107   Temp 98.7 °F (37.1 °C) (Oral)   Resp 20   Wt 80.3 kg (177 lb)   SpO2 98%   BMI 30.38 kg/m²     Last documented pain score (0-10 scale):    Last Weight:   Wt Readings from Last 1 Encounters:   24 80.3 kg (177 lb)     Mental Status:  {IP PT MENTAL STATUS:}    IV Access:  { STEFFI IV ACCESS:082480194}    Nursing Mobility/ADLs:  Walking   {CHP DME  applicable)   Name:  Address:  Dialysis Schedule:  Phone:  Fax:    / signature: {Esignature:[id number]}    PHYSICIAN SECTION    Prognosis: {Prognosis:4168686196}    Condition at Discharge: { Patient Condition:365454417}    Rehab Potential (if transferring to Rehab): {Prognosis:5694690166}    Recommended Labs or Other Treatments After Discharge: ***    Physician Certification: I certify the above information and transfer of Minoo Jaffe  is necessary for the continuing treatment of the diagnosis listed and that she requires {Admit to Appropriate Level of Care:25726} for {GREATER/LESS:814321510} 30 days.     Update Admission H&P: {CHP DME Changes in HandP:250546955}    PHYSICIAN SIGNATURE:  {Esignature:040538032}

## 2024-03-05 NOTE — ED PROVIDER NOTES
[facility] URGENT CARE  UrgentCare Encounter      CHIEFCOMPLAINT       Chief Complaint   Patient presents with    Generalized Body Aches    Emesis       Nurses Notes reviewed and I agree except as noted in the HPI.  HISTORY OF PRESENT ILLNESS   Minoo Jaffe is a 18 y.o. female who presents to urgent care with complaints of generalized bodyaches and vomiting.  She is approximately 33 weeks pregnant.  She denies urinary frequency, urgency, burning.  She states that she was just seen by OB/GYN yesterday and had her urine checked then.    REVIEW OF SYSTEMS     Review of Systems   Constitutional:  Positive for chills, fatigue and fever.   Gastrointestinal:  Positive for nausea and vomiting.       PAST MEDICAL HISTORY         Diagnosis Date    Abdominal pain     Fall     1/13/2024       SURGICAL HISTORY     Patient  has a past surgical history that includes Cholecystectomy (09/2021).    CURRENT MEDICATIONS       Discharge Medication List as of 3/5/2024  2:48 PM        CONTINUE these medications which have NOT CHANGED    Details   Prenatal MV-Min-Fe Fum-FA-DHA (PRENATAL 1 PO) Take 1 tablet by mouth dailyHistorical Med      scopolamine (TRANSDERM-SCOP) transdermal patch Place 1 patch onto the skin every 72 hours, Disp-3 patch, R-0Normal      pantoprazole (PROTONIX) 40 MG tablet Take 1 tablet by mouth every morning (before breakfast), Disp-30 tablet, R-3Normal      metoclopramide (REGLAN) 10 MG tablet 1 tablet every 6-8 hours for nausea vomiting when necessary, Disp-15 tablet, R-0Normal      potassium chloride (KLOR-CON M) 20 MEQ extended release tablet Take 1 tablet by mouth 2 times daily for 3 days, Disp-6 tablet, R-0Normal             ALLERGIES     Patient is has No Known Allergies.    FAMILY HISTORY     Patient'sfamily history is not on file.    SOCIAL HISTORY     Patient  reports that she has never smoked. She has never used smokeless tobacco. She reports that she does not currently use alcohol.  She reports that she does not currently use drugs after having used the following drugs: Marijuana (Weed).    PHYSICAL EXAM     ED TRIAGE VITALS  BP: 102/66, Temp: 98.7 °F (37.1 °C), Pulse: (!) 107, Respirations: 20, SpO2: 98 %  Physical Exam  Vitals and nursing note reviewed.   Constitutional:       General: She is not in acute distress.     Appearance: Normal appearance. She is not ill-appearing or toxic-appearing.   HENT:      Head: Normocephalic and atraumatic.      Nose: No congestion or rhinorrhea.      Mouth/Throat:      Mouth: Mucous membranes are moist.      Pharynx: Oropharynx is clear.   Eyes:      Extraocular Movements: Extraocular movements intact.      Conjunctiva/sclera: Conjunctivae normal.      Pupils: Pupils are equal, round, and reactive to light.   Cardiovascular:      Rate and Rhythm: Normal rate and regular rhythm.      Pulses: Normal pulses.      Heart sounds: Normal heart sounds. No murmur heard.  Pulmonary:      Effort: Pulmonary effort is normal. No respiratory distress.      Breath sounds: Normal breath sounds. No wheezing.   Abdominal:      General: Abdomen is flat.      Palpations: Abdomen is soft.      Tenderness: There is no abdominal tenderness.   Musculoskeletal:         General: No swelling or deformity. Normal range of motion.      Cervical back: Normal range of motion and neck supple.   Skin:     General: Skin is warm and dry.      Capillary Refill: Capillary refill takes less than 2 seconds.      Findings: No rash.   Neurological:      General: No focal deficit present.      Mental Status: She is alert and oriented to person, place, and time. Mental status is at baseline.   Psychiatric:         Mood and Affect: Mood normal.         Behavior: Behavior normal.         Thought Content: Thought content normal.         Judgment: Judgment normal.         DIAGNOSTIC RESULTS   Labs:  Results for orders placed or performed during the hospital encounter of 03/05/24   Rapid influenza A/B

## 2024-03-10 ENCOUNTER — HOSPITAL ENCOUNTER (OUTPATIENT)
Age: 19
Discharge: HOME | End: 2024-03-10
Attending: OBSTETRICS & GYNECOLOGY | Admitting: STUDENT IN AN ORGANIZED HEALTH CARE EDUCATION/TRAINING PROGRAM
Payer: COMMERCIAL

## 2024-03-10 VITALS
BODY MASS INDEX: 30.39 KG/M2 | HEIGHT: 64 IN | DIASTOLIC BLOOD PRESSURE: 72 MMHG | SYSTOLIC BLOOD PRESSURE: 115 MMHG | OXYGEN SATURATION: 100 % | HEART RATE: 106 BPM | WEIGHT: 178 LBS | RESPIRATION RATE: 14 BRPM | TEMPERATURE: 98 F

## 2024-03-10 PROBLEM — O26.90 ANTEPARTUM COMPLICATION: Status: ACTIVE | Noted: 2024-03-10

## 2024-03-10 PROCEDURE — 6370000000 HC RX 637 (ALT 250 FOR IP): Performed by: STUDENT IN AN ORGANIZED HEALTH CARE EDUCATION/TRAINING PROGRAM

## 2024-03-10 RX ORDER — ACETAMINOPHEN 500 MG
1000 TABLET ORAL ONCE
Status: COMPLETED | OUTPATIENT
Start: 2024-03-10 | End: 2024-03-10

## 2024-03-10 RX ADMIN — ACETAMINOPHEN 1000 MG: 500 TABLET ORAL at 21:19

## 2024-03-10 ASSESSMENT — PAIN SCALES - GENERAL: PAINLEVEL_OUTOF10: 7

## 2024-03-10 ASSESSMENT — PAIN DESCRIPTION - ORIENTATION: ORIENTATION: RIGHT;LEFT

## 2024-03-10 ASSESSMENT — PAIN DESCRIPTION - LOCATION: LOCATION: LEG

## 2024-03-10 ASSESSMENT — PAIN DESCRIPTION - DESCRIPTORS: DESCRIPTORS: ACHING

## 2024-03-11 NOTE — PLAN OF CARE
Problem: Vaginal Birth or  Section  Goal: Fetal and maternal status remain reassuring during the birth process  Description:  Birth OB-Pregnancy care plan goal which identifies if the fetal and maternal status remain reassuring during the birth process  Outcome: Progressing  Flowsheets (Taken 3/10/2024 2041)  Fetal and Maternal Status Remain Reassuring During the Birth Process:   Monitor vital signs   Monitor fetal heart rate   Monitor uterine activity     Problem: Pain  Goal: Verbalizes/displays adequate comfort level or baseline comfort level  Outcome: Progressing  Flowsheets (Taken 3/10/2024 2041)  Verbalizes/displays adequate comfort level or baseline comfort level:   Encourage patient to monitor pain and request assistance   Assess pain using appropriate pain scale   Implement non-pharmacological measures as appropriate and evaluate response   Notify Licensed Independent Practitioner if interventions unsuccessful or patient reports new pain     Problem: Discharge Planning  Goal: Discharge to home or other facility with appropriate resources  Outcome: Progressing  Flowsheets (Taken 3/10/2024 2041)  Discharge to home or other facility with appropriate resources:   Identify barriers to discharge with patient and caregiver   Arrange for needed discharge resources and transportation as appropriate   Refer to discharge planning if patient needs post-hospital services based on physician order or complex needs related to functional status, cognitive ability or social support system   Care plan reviewed with patient and FOB.  Patient and FOB verbalize understanding of the plan of care and contribute to goal setting.

## 2024-03-11 NOTE — FLOWSHEET NOTE
Patient ambulated off of unit in stable condition. Feeling baby move. Rates a decrease in discomfort. Respirations easy and non-labored. No further questions or concerns upon discharge.

## 2024-03-11 NOTE — DISCHARGE INSTRUCTIONS
care.  ? Call a medical facility before you show up. This will help the staff prevent others from being exposed.  ? Stay separate from others in your home, at least 6 feet. Use a separate bathroom, if possible.  ? Wear a cloth facemask to cover your nose and mouth when you are around other people including at home.  ? Clean your hands often. Wash your hands with soap and water for at least 20 seconds.  ? Cough or sneeze into a tissue or your arm. Wash your hands immediately after.  ? Don’t share personal household items including towels and bedding.  ? Clean and disinfect objects in your isolation area every day.  · If you are in labor and you have, or think you may have COVID-19, call your OB care provider and the hospital birthing unit before you go, so the staff can properly prepare and protect you, your baby and others from being infected. Wear a mask when you leave your home, as you will be asked to continue to wear a mask during your time in the hospital.  · Mother-to-child transmission of COVID-19 during pregnancy is unlikely, but after birth a baby is susceptible to person-to-person spread.   ? The determination of whether or not to separate you and your baby at the time of birth will be decided using shared decision-making between you and your clinical team.  ? After your baby is born, your health care provider may recommend you not hold your baby and/or that you stay in a separate room from your baby until you get better.  ? If you and your baby are not , wear a facemask at all times and wash your hands thoroughly before touching, holding or feeding your baby.

## 2024-03-11 NOTE — FLOWSHEET NOTE
Patient presents to the unit with C/O of her legs feeling \"achy\" for the past two days and nausea when the baby moves or she drinks something cold. Patient states she is feeling baby move and denies leakage of fluid or blood. She states she has intermittent pelvic pressure. She denies urinary symptoms. She states she has had one episode of emesis this morning but has been able to keep food and fluid down throughout the day. Patient currently drinking sprite upon arrival. No other questions or concerns at this time. FOB at bedside. Call light within reach.

## 2024-03-11 NOTE — FLOWSHEET NOTE
Discharge instructions and follow up care reviewed with patient and FOB. Both voice understanding. No further questions or concerns at this time.

## 2024-03-11 NOTE — FLOWSHEET NOTE
POC discussed with patient and FOB. Both agreeable to plan. No further questions or concerns at this time.

## 2024-03-11 NOTE — FLOWSHEET NOTE
Dr. Nicole returned page. Updated on Dr. Mcnally's  34+3 who presents to the unit for achy legs and nausea when her baby moves. Provided reactive strip report. Orders to administer 1,000 mg of tylenol and discharge home with office follow up. No other questions or concerns a this time.

## 2024-03-14 ENCOUNTER — APPOINTMENT (OUTPATIENT)
Dept: ULTRASOUND IMAGING | Age: 19
DRG: 540 | End: 2024-03-14
Payer: COMMERCIAL

## 2024-03-14 ENCOUNTER — ANESTHESIA (OUTPATIENT)
Dept: LABOR AND DELIVERY | Age: 19
End: 2024-03-14
Payer: COMMERCIAL

## 2024-03-14 ENCOUNTER — HOSPITAL ENCOUNTER (INPATIENT)
Age: 19
LOS: 4 days | Discharge: HOME | DRG: 540 | End: 2024-03-18
Attending: OBSTETRICS & GYNECOLOGY | Admitting: OBSTETRICS & GYNECOLOGY
Payer: COMMERCIAL

## 2024-03-14 ENCOUNTER — ANESTHESIA EVENT (OUTPATIENT)
Dept: LABOR AND DELIVERY | Age: 19
End: 2024-03-14
Payer: COMMERCIAL

## 2024-03-14 DIAGNOSIS — Z98.891 S/P C-SECTION: Primary | ICD-10-CM

## 2024-03-14 PROBLEM — O42.90 LEAKAGE OF AMNIOTIC FLUID: Status: ACTIVE | Noted: 2024-03-14

## 2024-03-14 LAB
ABO: NORMAL
AMPHETAMINES UR QL SCN: NEGATIVE
ANTIBODY SCREEN: NORMAL
BARBITURATES UR QL SCN: NEGATIVE
BENZODIAZ UR QL SCN: NEGATIVE
BZE UR QL SCN: NEGATIVE
CANNABINOIDS UR QL SCN: NEGATIVE
DEPRECATED RDW RBC AUTO: 42.9 FL (ref 35–45)
ERYTHROCYTE [DISTWIDTH] IN BLOOD BY AUTOMATED COUNT: 13.1 % (ref 11.5–14.5)
FENTANYL: NEGATIVE
HCT VFR BLD AUTO: 36.4 % (ref 37–47)
HGB BLD-MCNC: 11.8 GM/DL (ref 12–16)
MCH RBC QN AUTO: 29.6 PG (ref 26–33)
MCHC RBC AUTO-ENTMCNC: 32.4 GM/DL (ref 32.2–35.5)
MCV RBC AUTO: 91.5 FL (ref 81–99)
OPIATES UR QL SCN: NEGATIVE
OXYCODONE: NEGATIVE
PCP UR QL SCN: NEGATIVE
PLATELET # BLD AUTO: 290 THOU/MM3 (ref 130–400)
PMV BLD AUTO: 10.9 FL (ref 9.4–12.4)
RBC # BLD AUTO: 3.98 MILL/MM3 (ref 4.2–5.4)
RH FACTOR: NORMAL
WBC # BLD AUTO: 8.3 THOU/MM3 (ref 4.8–10.8)

## 2024-03-14 PROCEDURE — 6370000000 HC RX 637 (ALT 250 FOR IP): Performed by: STUDENT IN AN ORGANIZED HEALTH CARE EDUCATION/TRAINING PROGRAM

## 2024-03-14 PROCEDURE — 6360000002 HC RX W HCPCS: Performed by: NURSE ANESTHETIST, CERTIFIED REGISTERED

## 2024-03-14 PROCEDURE — 2500000003 HC RX 250 WO HCPCS: Performed by: OBSTETRICS & GYNECOLOGY

## 2024-03-14 PROCEDURE — 1220000001 HC SEMI PRIVATE L&D R&B

## 2024-03-14 PROCEDURE — A4216 STERILE WATER/SALINE, 10 ML: HCPCS | Performed by: OBSTETRICS & GYNECOLOGY

## 2024-03-14 PROCEDURE — 87081 CULTURE SCREEN ONLY: CPT

## 2024-03-14 PROCEDURE — 6370000000 HC RX 637 (ALT 250 FOR IP): Performed by: OBSTETRICS & GYNECOLOGY

## 2024-03-14 PROCEDURE — 76815 OB US LIMITED FETUS(S): CPT

## 2024-03-14 PROCEDURE — 2580000003 HC RX 258: Performed by: OBSTETRICS & GYNECOLOGY

## 2024-03-14 PROCEDURE — 86901 BLOOD TYPING SEROLOGIC RH(D): CPT

## 2024-03-14 PROCEDURE — 80307 DRUG TEST PRSMV CHEM ANLYZR: CPT

## 2024-03-14 PROCEDURE — 3700000000 HC ANESTHESIA ATTENDED CARE: Performed by: OBSTETRICS & GYNECOLOGY

## 2024-03-14 PROCEDURE — 3609079900 HC CESAREAN SECTION: Performed by: OBSTETRICS & GYNECOLOGY

## 2024-03-14 PROCEDURE — 86592 SYPHILIS TEST NON-TREP QUAL: CPT

## 2024-03-14 PROCEDURE — 7100000001 HC PACU RECOVERY - ADDTL 15 MIN: Performed by: OBSTETRICS & GYNECOLOGY

## 2024-03-14 PROCEDURE — 6360000002 HC RX W HCPCS: Performed by: STUDENT IN AN ORGANIZED HEALTH CARE EDUCATION/TRAINING PROGRAM

## 2024-03-14 PROCEDURE — 7100000000 HC PACU RECOVERY - FIRST 15 MIN: Performed by: OBSTETRICS & GYNECOLOGY

## 2024-03-14 PROCEDURE — 6360000002 HC RX W HCPCS

## 2024-03-14 PROCEDURE — 86900 BLOOD TYPING SEROLOGIC ABO: CPT

## 2024-03-14 PROCEDURE — 2709999900 HC NON-CHARGEABLE SUPPLY: Performed by: OBSTETRICS & GYNECOLOGY

## 2024-03-14 PROCEDURE — 87653 STREP B DNA AMP PROBE: CPT

## 2024-03-14 PROCEDURE — 86850 RBC ANTIBODY SCREEN: CPT

## 2024-03-14 PROCEDURE — 6360000002 HC RX W HCPCS: Performed by: OBSTETRICS & GYNECOLOGY

## 2024-03-14 PROCEDURE — 85027 COMPLETE CBC AUTOMATED: CPT

## 2024-03-14 PROCEDURE — 3700000001 HC ADD 15 MINUTES (ANESTHESIA): Performed by: OBSTETRICS & GYNECOLOGY

## 2024-03-14 RX ORDER — SODIUM CHLORIDE, SODIUM LACTATE, POTASSIUM CHLORIDE, AND CALCIUM CHLORIDE .6; .31; .03; .02 G/100ML; G/100ML; G/100ML; G/100ML
1000 INJECTION, SOLUTION INTRAVENOUS ONCE
Status: COMPLETED | OUTPATIENT
Start: 2024-03-14 | End: 2024-03-14

## 2024-03-14 RX ORDER — CITRIC ACID/SODIUM CITRATE 334-500MG
15 SOLUTION, ORAL ORAL ONCE
Status: COMPLETED | OUTPATIENT
Start: 2024-03-14 | End: 2024-03-14

## 2024-03-14 RX ORDER — KETOROLAC TROMETHAMINE 30 MG/ML
INJECTION, SOLUTION INTRAMUSCULAR; INTRAVENOUS PRN
Status: DISCONTINUED | OUTPATIENT
Start: 2024-03-14 | End: 2024-03-14 | Stop reason: SDUPTHER

## 2024-03-14 RX ORDER — BUPIVACAINE HYDROCHLORIDE 7.5 MG/ML
INJECTION, SOLUTION INTRASPINAL
Status: COMPLETED | OUTPATIENT
Start: 2024-03-14 | End: 2024-03-14

## 2024-03-14 RX ORDER — FENTANYL CITRATE 50 UG/ML
INJECTION, SOLUTION INTRAMUSCULAR; INTRAVENOUS
Status: COMPLETED | OUTPATIENT
Start: 2024-03-14 | End: 2024-03-14

## 2024-03-14 RX ORDER — METOCLOPRAMIDE HYDROCHLORIDE 5 MG/ML
10 INJECTION INTRAMUSCULAR; INTRAVENOUS ONCE
Status: COMPLETED | OUTPATIENT
Start: 2024-03-14 | End: 2024-03-14

## 2024-03-14 RX ORDER — OXYTOCIN 10 [USP'U]/ML
INJECTION, SOLUTION INTRAMUSCULAR; INTRAVENOUS PRN
Status: DISCONTINUED | OUTPATIENT
Start: 2024-03-14 | End: 2024-03-14 | Stop reason: SDUPTHER

## 2024-03-14 RX ORDER — MORPHINE SULFATE 0.5 MG/ML
INJECTION, SOLUTION EPIDURAL; INTRATHECAL; INTRAVENOUS
Status: COMPLETED | OUTPATIENT
Start: 2024-03-14 | End: 2024-03-14

## 2024-03-14 RX ORDER — OXYCODONE HYDROCHLORIDE 5 MG/1
5 TABLET ORAL EVERY 4 HOURS PRN
Status: DISPENSED | OUTPATIENT
Start: 2024-03-14 | End: 2024-03-15

## 2024-03-14 RX ORDER — DEXAMETHASONE SODIUM PHOSPHATE 4 MG/ML
INJECTION, SOLUTION INTRA-ARTICULAR; INTRALESIONAL; INTRAMUSCULAR; INTRAVENOUS; SOFT TISSUE PRN
Status: DISCONTINUED | OUTPATIENT
Start: 2024-03-14 | End: 2024-03-14 | Stop reason: SDUPTHER

## 2024-03-14 RX ORDER — PHENYLEPHRINE HCL IN 0.9% NACL 1 MG/10 ML
SYRINGE (ML) INTRAVENOUS PRN
Status: DISCONTINUED | OUTPATIENT
Start: 2024-03-14 | End: 2024-03-14 | Stop reason: SDUPTHER

## 2024-03-14 RX ORDER — OXYCODONE HYDROCHLORIDE 5 MG/1
10 TABLET ORAL EVERY 4 HOURS PRN
Status: ACTIVE | OUTPATIENT
Start: 2024-03-14 | End: 2024-03-15

## 2024-03-14 RX ORDER — ONDANSETRON 2 MG/ML
INJECTION INTRAMUSCULAR; INTRAVENOUS PRN
Status: DISCONTINUED | OUTPATIENT
Start: 2024-03-14 | End: 2024-03-14 | Stop reason: SDUPTHER

## 2024-03-14 RX ORDER — ACETAMINOPHEN 325 MG/1
975 TABLET ORAL ONCE
Status: COMPLETED | OUTPATIENT
Start: 2024-03-14 | End: 2024-03-14

## 2024-03-14 RX ORDER — SODIUM CHLORIDE, SODIUM LACTATE, POTASSIUM CHLORIDE, CALCIUM CHLORIDE 600; 310; 30; 20 MG/100ML; MG/100ML; MG/100ML; MG/100ML
INJECTION, SOLUTION INTRAVENOUS CONTINUOUS
Status: DISCONTINUED | OUTPATIENT
Start: 2024-03-14 | End: 2024-03-15

## 2024-03-14 RX ORDER — OXYTOCIN/0.9 % SODIUM CHLORIDE 30/500 ML
87.3 PLASTIC BAG, INJECTION (ML) INTRAVENOUS CONTINUOUS PRN
Status: DISCONTINUED | OUTPATIENT
Start: 2024-03-14 | End: 2024-03-15

## 2024-03-14 RX ORDER — ONDANSETRON 2 MG/ML
4 INJECTION INTRAMUSCULAR; INTRAVENOUS EVERY 6 HOURS PRN
Status: DISCONTINUED | OUTPATIENT
Start: 2024-03-14 | End: 2024-03-15

## 2024-03-14 RX ADMIN — SODIUM CHLORIDE, POTASSIUM CHLORIDE, SODIUM LACTATE AND CALCIUM CHLORIDE 1000 ML: 600; 310; 30; 20 INJECTION, SOLUTION INTRAVENOUS at 19:41

## 2024-03-14 RX ADMIN — Medication 100 MCG: at 20:46

## 2024-03-14 RX ADMIN — DEXAMETHASONE SODIUM PHOSPHATE 10 MG: 4 INJECTION, SOLUTION INTRAMUSCULAR; INTRAVENOUS at 20:54

## 2024-03-14 RX ADMIN — Medication 100 MCG: at 20:44

## 2024-03-14 RX ADMIN — FAMOTIDINE 20 MG: 10 INJECTION, SOLUTION INTRAVENOUS at 19:50

## 2024-03-14 RX ADMIN — SODIUM CITRATE AND CITRIC ACID MONOHYDRATE 15 ML: 500; 334 SOLUTION ORAL at 19:50

## 2024-03-14 RX ADMIN — METOCLOPRAMIDE 10 MG: 5 INJECTION, SOLUTION INTRAMUSCULAR; INTRAVENOUS at 19:50

## 2024-03-14 RX ADMIN — KETOROLAC TROMETHAMINE 30 MG: 30 INJECTION, SOLUTION INTRAMUSCULAR; INTRAVENOUS at 21:23

## 2024-03-14 RX ADMIN — SODIUM CHLORIDE, POTASSIUM CHLORIDE, SODIUM LACTATE AND CALCIUM CHLORIDE: 600; 310; 30; 20 INJECTION, SOLUTION INTRAVENOUS at 21:33

## 2024-03-14 RX ADMIN — OXYTOCIN 20 UNITS: 10 INJECTION INTRAVENOUS at 20:53

## 2024-03-14 RX ADMIN — FENTANYL CITRATE 20 MCG: 50 INJECTION, SOLUTION INTRAMUSCULAR; INTRAVENOUS at 20:31

## 2024-03-14 RX ADMIN — Medication 87.3 MILLI-UNITS/MIN: at 21:37

## 2024-03-14 RX ADMIN — OXYCODONE 5 MG: 5 TABLET ORAL at 23:47

## 2024-03-14 RX ADMIN — ONDANSETRON 4 MG: 2 INJECTION INTRAMUSCULAR; INTRAVENOUS at 20:27

## 2024-03-14 RX ADMIN — SODIUM CHLORIDE, POTASSIUM CHLORIDE, SODIUM LACTATE AND CALCIUM CHLORIDE: 600; 310; 30; 20 INJECTION, SOLUTION INTRAVENOUS at 20:53

## 2024-03-14 RX ADMIN — WATER 2000 MG: 1 INJECTION INTRAMUSCULAR; INTRAVENOUS; SUBCUTANEOUS at 20:36

## 2024-03-14 RX ADMIN — SODIUM CHLORIDE, POTASSIUM CHLORIDE, SODIUM LACTATE AND CALCIUM CHLORIDE: 600; 310; 30; 20 INJECTION, SOLUTION INTRAVENOUS at 20:29

## 2024-03-14 RX ADMIN — AZITHROMYCIN MONOHYDRATE 500 MG: 500 INJECTION, POWDER, LYOPHILIZED, FOR SOLUTION INTRAVENOUS at 20:36

## 2024-03-14 RX ADMIN — KETOROLAC TROMETHAMINE 30 MG: 60 INJECTION, SOLUTION INTRAMUSCULAR at 21:12

## 2024-03-14 RX ADMIN — MORPHINE SULFATE 0.2 MG: 0.5 INJECTION, SOLUTION EPIDURAL; INTRATHECAL; INTRAVENOUS at 20:31

## 2024-03-14 RX ADMIN — ACETAMINOPHEN 975 MG: 325 TABLET ORAL at 19:50

## 2024-03-14 RX ADMIN — BUPIVACAINE HYDROCHLORIDE 10.5 MG: 7.5 INJECTION INTRAVENOUS at 20:31

## 2024-03-14 ASSESSMENT — PAIN SCALES - GENERAL: PAINLEVEL_OUTOF10: 6

## 2024-03-14 NOTE — FLOWSHEET NOTE
SCN called and informed of ROM of pt at 35weeks. US coming to confirm presentation. If non-vertex will proceed with primary  section.

## 2024-03-14 NOTE — ANESTHESIA PRE PROCEDURE
Department of Anesthesiology  Preprocedure Note       Name:  Minoo Jaffe   Age:  18 y.o.  :  2005                                          MRN:  [id number]         Date:  3/14/2024      Surgeon: Surgeon(s):  Amairani Yoo MD    Procedure: Procedure(s):   SECTION    Medications prior to admission:   Prior to Admission medications    Medication Sig Start Date End Date Taking? Authorizing Provider   Prenatal MV-Min-Fe Fum-FA-DHA (PRENATAL 1 PO) Take 1 tablet by mouth daily    Provider, MD Jim   scopolamine (TRANSDERM-SCOP) transdermal patch Place 1 patch onto the skin every 72 hours 23   Good Ferris PA-C   pantoprazole (PROTONIX) 40 MG tablet Take 1 tablet by mouth every morning (before breakfast) 23   Good Ferris PA-C   metoclopramide (REGLAN) 10 MG tablet 1 tablet every 6-8 hours for nausea vomiting when necessary 22   David Rivera MD   potassium chloride (KLOR-CON M) 20 MEQ extended release tablet Take 1 tablet by mouth 2 times daily for 3 days 12/7/22 12/10/22  David Rivera MD       Current medications:    Current Facility-Administered Medications   Medication Dose Route Frequency Provider Last Rate Last Admin   • lactated ringers IV soln infusion   IntraVENous Continuous Amairani Yoo MD       • lactated ringers bolus 1,000 mL  1,000 mL IntraVENous Once Amairani Yoo  mL/hr at 24 1,000 mL at 24   • oxytocin (PITOCIN) 30 units in 500 mL infusion  87.3 sean-units/min IntraVENous Continuous PRN Amairani Yoo MD        And   • oxytocin (PITOCIN) 10 unit bolus from the bag  10 Units IntraVENous PRN Amairani Yoo MD       • ondansetron (ZOFRAN) injection 4 mg  4 mg IntraVENous Q6H PRN Amairani Yoo MD       • ceFAZolin (ANCEF) 2,000 mg in sterile water 20 mL IV syringe  2,000 mg IntraVENous Once Amairani Yoo MD       • azithromycin (ZITHROMAX) 500 mg in sodium

## 2024-03-14 NOTE — FLOWSHEET NOTE
Dr BETHANY Yoo returned page informed of  at 35 weeks here with gross ROM of clear fluids around 1715. Clear fluids noted to be leaking upon examination. Reactive fetal tracing noted with contractions. Pt informed RN that baby wasn't head down at 32 weeks. VE -/non-vertex presentation noted. GBS collected. Orders received to get an US to determine presentation, call OR to schedule case and prep pt for a  section.

## 2024-03-14 NOTE — FLOWSHEET NOTE
OR called to inform of pts admission with ROM and ? Non-vertex presentation US coming to verify. If non-vertex will proceed with primary  section with Dr BETHANY jauregui. OR team will be available in approximately 15-20 minutes. RN will call and inform of POC once US is completed.

## 2024-03-14 NOTE — FLOWSHEET NOTE
Pt presents to 5C01 with c/o SROM . Pt denies any vaginal bleeding. Fetal movement noted per pt. Pt oriented to room and call system Patient to bathroom to void, informed of maternal drug testing policy in place on all laboring patients. Verbal consent received, paper consent to be signed and urine to be sent. Pt states she needs to poop. Pt instructed not to poop and wait for us to check her cervix.

## 2024-03-15 PROBLEM — Z98.891 S/P C-SECTION: Status: ACTIVE | Noted: 2024-03-15

## 2024-03-15 LAB
HGB BLD-MCNC: 11.7 GM/DL (ref 12–16)
RPR SER QL: NONREACTIVE

## 2024-03-15 PROCEDURE — 1200000000 HC SEMI PRIVATE

## 2024-03-15 PROCEDURE — 2580000003 HC RX 258: Performed by: OBSTETRICS & GYNECOLOGY

## 2024-03-15 PROCEDURE — 85018 HEMOGLOBIN: CPT

## 2024-03-15 PROCEDURE — 6370000000 HC RX 637 (ALT 250 FOR IP): Performed by: STUDENT IN AN ORGANIZED HEALTH CARE EDUCATION/TRAINING PROGRAM

## 2024-03-15 PROCEDURE — 6360000002 HC RX W HCPCS: Performed by: STUDENT IN AN ORGANIZED HEALTH CARE EDUCATION/TRAINING PROGRAM

## 2024-03-15 PROCEDURE — 36415 COLL VENOUS BLD VENIPUNCTURE: CPT

## 2024-03-15 PROCEDURE — 6370000000 HC RX 637 (ALT 250 FOR IP): Performed by: OBSTETRICS & GYNECOLOGY

## 2024-03-15 RX ORDER — NALOXONE HYDROCHLORIDE 0.4 MG/ML
INJECTION, SOLUTION INTRAMUSCULAR; INTRAVENOUS; SUBCUTANEOUS PRN
Status: ACTIVE | OUTPATIENT
Start: 2024-03-15 | End: 2024-03-15

## 2024-03-15 RX ORDER — DIPHENHYDRAMINE HCL 25 MG
25 TABLET ORAL EVERY 6 HOURS PRN
Status: DISCONTINUED | OUTPATIENT
Start: 2024-03-15 | End: 2024-03-19 | Stop reason: HOSPADM

## 2024-03-15 RX ORDER — KETOROLAC TROMETHAMINE 30 MG/ML
30 INJECTION, SOLUTION INTRAMUSCULAR; INTRAVENOUS EVERY 6 HOURS PRN
Status: DISPENSED | OUTPATIENT
Start: 2024-03-15 | End: 2024-03-15

## 2024-03-15 RX ORDER — IBUPROFEN 800 MG/1
800 TABLET ORAL EVERY 8 HOURS
Qty: 30 TABLET | Refills: 1 | Status: SHIPPED | OUTPATIENT
Start: 2024-03-16

## 2024-03-15 RX ORDER — NALBUPHINE HYDROCHLORIDE 10 MG/ML
5 INJECTION, SOLUTION INTRAMUSCULAR; INTRAVENOUS; SUBCUTANEOUS EVERY 4 HOURS PRN
Status: DISPENSED | OUTPATIENT
Start: 2024-03-15 | End: 2024-03-15

## 2024-03-15 RX ORDER — ACETAMINOPHEN 500 MG
1000 TABLET ORAL EVERY 8 HOURS
Status: DISCONTINUED | OUTPATIENT
Start: 2024-03-15 | End: 2024-03-19 | Stop reason: HOSPADM

## 2024-03-15 RX ORDER — FERROUS SULFATE 325(65) MG
325 TABLET ORAL
Status: DISCONTINUED | OUTPATIENT
Start: 2024-03-15 | End: 2024-03-19 | Stop reason: HOSPADM

## 2024-03-15 RX ORDER — MORPHINE SULFATE 4 MG/ML
4 INJECTION, SOLUTION INTRAMUSCULAR; INTRAVENOUS
Status: DISCONTINUED | OUTPATIENT
Start: 2024-03-15 | End: 2024-03-19 | Stop reason: HOSPADM

## 2024-03-15 RX ORDER — SODIUM CHLORIDE 0.9 % (FLUSH) 0.9 %
5-40 SYRINGE (ML) INJECTION EVERY 12 HOURS SCHEDULED
Status: DISCONTINUED | OUTPATIENT
Start: 2024-03-15 | End: 2024-03-19 | Stop reason: HOSPADM

## 2024-03-15 RX ORDER — OXYCODONE HYDROCHLORIDE 5 MG/1
5 TABLET ORAL EVERY 6 HOURS PRN
Qty: 28 TABLET | Refills: 0 | Status: SHIPPED | OUTPATIENT
Start: 2024-03-15 | End: 2024-03-22

## 2024-03-15 RX ORDER — MISOPROSTOL 200 UG/1
800 TABLET ORAL PRN
Status: DISCONTINUED | OUTPATIENT
Start: 2024-03-15 | End: 2024-03-19 | Stop reason: HOSPADM

## 2024-03-15 RX ORDER — SIMETHICONE 80 MG
80 TABLET,CHEWABLE ORAL EVERY 6 HOURS PRN
Status: DISCONTINUED | OUTPATIENT
Start: 2024-03-15 | End: 2024-03-19 | Stop reason: HOSPADM

## 2024-03-15 RX ORDER — SODIUM CHLORIDE, SODIUM LACTATE, POTASSIUM CHLORIDE, CALCIUM CHLORIDE 600; 310; 30; 20 MG/100ML; MG/100ML; MG/100ML; MG/100ML
INJECTION, SOLUTION INTRAVENOUS CONTINUOUS
Status: DISCONTINUED | OUTPATIENT
Start: 2024-03-15 | End: 2024-03-19 | Stop reason: HOSPADM

## 2024-03-15 RX ORDER — SODIUM CHLORIDE 9 MG/ML
INJECTION, SOLUTION INTRAVENOUS PRN
Status: DISCONTINUED | OUTPATIENT
Start: 2024-03-15 | End: 2024-03-19 | Stop reason: HOSPADM

## 2024-03-15 RX ORDER — DIPHENHYDRAMINE HYDROCHLORIDE 50 MG/ML
12.5 INJECTION INTRAMUSCULAR; INTRAVENOUS ONCE
Status: COMPLETED | OUTPATIENT
Start: 2024-03-15 | End: 2024-03-15

## 2024-03-15 RX ORDER — KETOROLAC TROMETHAMINE 30 MG/ML
30 INJECTION, SOLUTION INTRAMUSCULAR; INTRAVENOUS EVERY 6 HOURS
Status: DISCONTINUED | OUTPATIENT
Start: 2024-03-15 | End: 2024-03-16

## 2024-03-15 RX ORDER — CARBOPROST TROMETHAMINE 250 UG/ML
250 INJECTION, SOLUTION INTRAMUSCULAR PRN
Status: DISCONTINUED | OUTPATIENT
Start: 2024-03-15 | End: 2024-03-19 | Stop reason: HOSPADM

## 2024-03-15 RX ORDER — METHYLERGONOVINE MALEATE 0.2 MG/ML
200 INJECTION INTRAVENOUS PRN
Status: DISCONTINUED | OUTPATIENT
Start: 2024-03-15 | End: 2024-03-19 | Stop reason: HOSPADM

## 2024-03-15 RX ORDER — ONDANSETRON 2 MG/ML
4 INJECTION INTRAMUSCULAR; INTRAVENOUS EVERY 6 HOURS PRN
Status: ACTIVE | OUTPATIENT
Start: 2024-03-15 | End: 2024-03-15

## 2024-03-15 RX ORDER — MODIFIED LANOLIN
OINTMENT (GRAM) TOPICAL
Status: DISCONTINUED | OUTPATIENT
Start: 2024-03-15 | End: 2024-03-19 | Stop reason: HOSPADM

## 2024-03-15 RX ORDER — ZOLPIDEM TARTRATE 10 MG/1
10 TABLET ORAL NIGHTLY PRN
Status: DISCONTINUED | OUTPATIENT
Start: 2024-03-15 | End: 2024-03-19 | Stop reason: HOSPADM

## 2024-03-15 RX ORDER — OXYCODONE HYDROCHLORIDE 5 MG/1
10 TABLET ORAL EVERY 4 HOURS PRN
Status: DISCONTINUED | OUTPATIENT
Start: 2024-03-15 | End: 2024-03-19 | Stop reason: HOSPADM

## 2024-03-15 RX ORDER — MORPHINE SULFATE 2 MG/ML
2 INJECTION, SOLUTION INTRAMUSCULAR; INTRAVENOUS
Status: DISCONTINUED | OUTPATIENT
Start: 2024-03-15 | End: 2024-03-19 | Stop reason: HOSPADM

## 2024-03-15 RX ORDER — PRENATAL WITH FERROUS FUM AND FOLIC ACID 3080; 920; 120; 400; 22; 1.84; 3; 20; 10; 1; 12; 200; 27; 25; 2 [IU]/1; [IU]/1; MG/1; [IU]/1; MG/1; MG/1; MG/1; MG/1; MG/1; MG/1; UG/1; MG/1; MG/1; MG/1; MG/1
1 TABLET ORAL DAILY
Status: DISCONTINUED | OUTPATIENT
Start: 2024-03-15 | End: 2024-03-19 | Stop reason: HOSPADM

## 2024-03-15 RX ORDER — ONDANSETRON 2 MG/ML
4 INJECTION INTRAMUSCULAR; INTRAVENOUS EVERY 6 HOURS PRN
Status: DISCONTINUED | OUTPATIENT
Start: 2024-03-15 | End: 2024-03-19 | Stop reason: HOSPADM

## 2024-03-15 RX ORDER — BISACODYL 10 MG
10 SUPPOSITORY, RECTAL RECTAL DAILY PRN
Status: DISCONTINUED | OUTPATIENT
Start: 2024-03-15 | End: 2024-03-19 | Stop reason: HOSPADM

## 2024-03-15 RX ORDER — SODIUM CHLORIDE 0.9 % (FLUSH) 0.9 %
5-40 SYRINGE (ML) INJECTION PRN
Status: DISCONTINUED | OUTPATIENT
Start: 2024-03-15 | End: 2024-03-19 | Stop reason: HOSPADM

## 2024-03-15 RX ORDER — DOCUSATE SODIUM 100 MG/1
100 CAPSULE, LIQUID FILLED ORAL 2 TIMES DAILY
Status: DISCONTINUED | OUTPATIENT
Start: 2024-03-15 | End: 2024-03-19 | Stop reason: HOSPADM

## 2024-03-15 RX ORDER — OXYCODONE HYDROCHLORIDE 5 MG/1
5 TABLET ORAL EVERY 4 HOURS PRN
Status: DISCONTINUED | OUTPATIENT
Start: 2024-03-15 | End: 2024-03-19 | Stop reason: HOSPADM

## 2024-03-15 RX ORDER — IBUPROFEN 800 MG/1
800 TABLET ORAL EVERY 8 HOURS
Status: DISCONTINUED | OUTPATIENT
Start: 2024-03-16 | End: 2024-03-16

## 2024-03-15 RX ORDER — ONDANSETRON 4 MG/1
4 TABLET, ORALLY DISINTEGRATING ORAL EVERY 8 HOURS PRN
Status: DISCONTINUED | OUTPATIENT
Start: 2024-03-15 | End: 2024-03-19 | Stop reason: HOSPADM

## 2024-03-15 RX ADMIN — ACETAMINOPHEN 1000 MG: 500 TABLET ORAL at 17:07

## 2024-03-15 RX ADMIN — ACETAMINOPHEN 1000 MG: 500 TABLET ORAL at 08:23

## 2024-03-15 RX ADMIN — DOCUSATE SODIUM 100 MG: 100 CAPSULE, LIQUID FILLED ORAL at 08:24

## 2024-03-15 RX ADMIN — DOCUSATE SODIUM 100 MG: 100 CAPSULE, LIQUID FILLED ORAL at 19:40

## 2024-03-15 RX ADMIN — SODIUM CHLORIDE, POTASSIUM CHLORIDE, SODIUM LACTATE AND CALCIUM CHLORIDE: 600; 310; 30; 20 INJECTION, SOLUTION INTRAVENOUS at 13:43

## 2024-03-15 RX ADMIN — SODIUM CHLORIDE, POTASSIUM CHLORIDE, SODIUM LACTATE AND CALCIUM CHLORIDE: 600; 310; 30; 20 INJECTION, SOLUTION INTRAVENOUS at 05:13

## 2024-03-15 RX ADMIN — DIPHENHYDRAMINE HYDROCHLORIDE 12.5 MG: 50 INJECTION INTRAMUSCULAR; INTRAVENOUS at 01:23

## 2024-03-15 RX ADMIN — OXYCODONE 5 MG: 5 TABLET ORAL at 19:40

## 2024-03-15 RX ADMIN — KETOROLAC TROMETHAMINE 30 MG: 30 INJECTION INTRAMUSCULAR; INTRAVENOUS at 12:17

## 2024-03-15 RX ADMIN — KETOROLAC TROMETHAMINE 30 MG: 30 INJECTION INTRAMUSCULAR; INTRAVENOUS at 05:16

## 2024-03-15 RX ADMIN — KETOROLAC TROMETHAMINE 30 MG: 30 INJECTION INTRAMUSCULAR; INTRAVENOUS at 19:40

## 2024-03-15 ASSESSMENT — PAIN - FUNCTIONAL ASSESSMENT
PAIN_FUNCTIONAL_ASSESSMENT: ACTIVITIES ARE NOT PREVENTED

## 2024-03-15 ASSESSMENT — PAIN DESCRIPTION - DESCRIPTORS
DESCRIPTORS: ACHING;DISCOMFORT
DESCRIPTORS: ACHING;SORE
DESCRIPTORS: ACHING

## 2024-03-15 ASSESSMENT — PAIN DESCRIPTION - PAIN TYPE: TYPE: ACUTE PAIN;SURGICAL PAIN

## 2024-03-15 ASSESSMENT — PAIN SCALES - GENERAL
PAINLEVEL_OUTOF10: 4
PAINLEVEL_OUTOF10: 5
PAINLEVEL_OUTOF10: 5
PAINLEVEL_OUTOF10: 4
PAINLEVEL_OUTOF10: 5
PAINLEVEL_OUTOF10: 5

## 2024-03-15 ASSESSMENT — PAIN DESCRIPTION - ORIENTATION
ORIENTATION: LOWER

## 2024-03-15 ASSESSMENT — PAIN DESCRIPTION - LOCATION
LOCATION: ABDOMEN;INCISION
LOCATION: ABDOMEN
LOCATION: INCISION
LOCATION: ABDOMEN;INCISION
LOCATION: ABDOMEN;INCISION

## 2024-03-15 ASSESSMENT — PAIN DESCRIPTION - FREQUENCY: FREQUENCY: INTERMITTENT

## 2024-03-15 ASSESSMENT — PAIN DESCRIPTION - ONSET: ONSET: ON-GOING

## 2024-03-15 NOTE — PROGRESS NOTES
Progress note    Subjective:     Postoperative Day 1:  Delivery    Pain is well controlled with current medications.  The patient is ambulating well. The patient is tolerating a normal diet.    Objective:     Vitals:    03/15/24 0811   BP: 122/78   Pulse: 55   Resp: 18   Temp: 97.8 °F (36.6 °C)   SpO2: 100%         UOP: 850cc/8hr        General:    alert, appears stated age, and cooperative   Uterine Fundus:   firm   Incision:  covered        CBC   Lab Results   Component Value Date    HGB 11.7 (L) 03/15/2024        Assessment:     Status post  section. Doing well postoperatively.      Plan:     Advance diet. Remove IV. Remove umaña.      Lise Mcnally MD 3/15/2024 10:57 AM

## 2024-03-15 NOTE — FLOWSHEET NOTE
Pt in stable condition. Transported via bed to [facility] with FOB at side. Report given to Alie COURTNEY. Pt and RN denies any questions.

## 2024-03-15 NOTE — FLOWSHEET NOTE
Paged Dr BETHANY Yoo to call unit.  MD returned page. Updated that US performed and baby is breech.  IV started and labs are active in process.  MD will be in. Will call surgery for primary  section.

## 2024-03-15 NOTE — PLAN OF CARE
Problem: Pain  Goal: Verbalizes/displays adequate comfort level or baseline comfort level  Outcome: Progressing     Problem: Vaginal Birth or  Section  Goal: Fetal and maternal status remain reassuring during the birth process  Description:  Birth OB-Pregnancy care plan goal which identifies if the fetal and maternal status remain reassuring during the birth process  Outcome: Progressing  Flowsheets (Taken 3/14/2024 2005)  Fetal and Maternal Status Remain Reassuring During the Birth Process:   Monitor vital signs   Monitor fetal heart rate   Monitor uterine activity   Deep vein thrombosis prophylaxis ( delivery)   Surgical antibiotic prophylaxis ( delivery)     Problem: Infection - Adult  Goal: Absence of infection during hospitalization  Outcome: Progressing  Flowsheets (Taken 3/14/2024 2005)  Absence of infection during hospitalization:   Assess and monitor for signs and symptoms of infection   Instruct and encourage patient and family to use good hand hygiene technique     Problem: Safety - Adult  Goal: Free from fall injury  Outcome: Progressing  Flowsheets (Taken 3/14/2024 2005)  Free From Fall Injury:   Instruct family/caregiver on patient safety   Based on caregiver fall risk screen, instruct family/caregiver to ask for assistance with transferring infant if caregiver noted to have fall risk factors     Problem: Discharge Planning  Goal: Discharge to home or other facility with appropriate resources  Outcome: Progressing  Flowsheets (Taken 3/14/2024 2005)  Discharge to home or other facility with appropriate resources:   Identify barriers to discharge with patient and caregiver   Arrange for needed discharge resources and transportation as appropriate   Care plan reviewed with patient.  Patient verbalize understanding of the plan of care and contribute to goal setting.

## 2024-03-15 NOTE — PLAN OF CARE
Care plan reviewed with patient .  Patient   Problem: Postpartum  Goal: Experiences normal postpartum course  Description:  Postpartum OB-Pregnancy care plan goal which identifies if the mother is experiencing a normal postpartum course  3/15/2024 1355 by Dianelys Ridley RN  Outcome: Completed  Flowsheets (Taken 3/15/2024 0811)  Experiences Normal Postpartum Course: Monitor maternal vital signs  3/15/2024 0405 by Alie Mccollum RN  Outcome: Progressing  Flowsheets (Taken 3/15/2024 0405)  Experiences Normal Postpartum Course:   Monitor maternal vital signs   Assess uterine involution  Goal: Appropriate maternal -  bonding  Description:  Postpartum OB-Pregnancy care plan goal which identifies if the mother and  are bonding appropriately  3/15/2024 1355 by Dianelys Ridley RN  Outcome: Completed  3/15/2024 0405 by Alie Mccollum RN  Outcome: Progressing  Note: Bonding with baby, participating in infant care.    Goal: Establishment of infant feeding pattern  Description:  Postpartum OB-Pregnancy care plan goal which identifies if the mother is establishing a feeding pattern with their   3/15/2024 1355 by Dianelys Ridley RN  Outcome: Completed  Flowsheets (Taken 3/15/2024 0811)  Establishment of Infant Feeding Pattern: Assess breast/bottle feeding  3/15/2024 0405 by Alie Mccollum RN  Outcome: Progressing  Flowsheets (Taken 3/15/2024 0405)  Establishment of Infant Feeding Pattern:   Assess breast/bottle feeding   Refer to lactation as needed  Goal: Incisions, wounds, or drain sites healing without S/S of infection  3/15/2024 1355 by Dianelys Ridley RN  Outcome: Completed  3/15/2024 0405 by Alie Mccollum RN  Outcome: Progressing  Flowsheets (Taken 3/15/2024 0405)  Incisions, Wounds, or Drain Sites Healing Without Sign and Symptoms of Infection: ADMISSION and DAILY: Assess and document risk factors for pressure ulcer development     Problem: Pain  Goal: Verbalizes/displays adequate comfort level or  lab/diagnostic results  Taken 3/14/2024 2005 by Alie Purcell RN  Absence of infection during hospitalization:   Assess and monitor for signs and symptoms of infection   Instruct and encourage patient and family to use good hand hygiene technique  Goal: Absence of fever/infection during anticipated neutropenic period  3/15/2024 1355 by Dianelys Ridley RN  Outcome: Completed  Flowsheets (Taken 3/15/2024 0405 by Alie Mccollum RN)  Absence of fever/infection during anticipated neutropenic period: Monitor white blood cell count  3/15/2024 0405 by Alie Mccollum RN  Outcome: Progressing  Flowsheets (Taken 3/15/2024 0405)  Absence of fever/infection during anticipated neutropenic period: Monitor white blood cell count     Problem: Safety - Adult  Goal: Free from fall injury  3/15/2024 1355 by Dianelys Ridley RN  Outcome: Completed  Flowsheets (Taken 3/15/2024 0405 by Alie Mccollum RN)  Free From Fall Injury: Instruct family/caregiver on patient safety  3/15/2024 0405 by Alie Mccollum RN  Outcome: Progressing  Flowsheets  Taken 3/15/2024 0405 by Alie Mccollum RN  Free From Fall Injury: Instruct family/caregiver on patient safety  Taken 3/15/2024 0005 by Alie Mccollum RN  Free From Fall Injury: Instruct family/caregiver on patient safety  Taken 3/14/2024 2005 by Alie Purcell RN  Free From Fall Injury:   Instruct family/caregiver on patient safety   Based on caregiver fall risk screen, instruct family/caregiver to ask for assistance with transferring infant if caregiver noted to have fall risk factors     Problem: Discharge Planning  Goal: Discharge to home or other facility with appropriate resources  3/15/2024 1355 by Dianelys Ridley RN  Outcome: Completed  Flowsheets (Taken 3/15/2024 0405 by Alie Mccollum RN)  Discharge to home or other facility with appropriate resources: Identify barriers to discharge with patient and caregiver  3/15/2024 0405 by Alie Mccollum RN  Outcome:

## 2024-03-15 NOTE — PLAN OF CARE
Problem: Safety - Adult  Goal: Free from fall injury  Outcome: Progressing  Flowsheets  Taken 3/15/2024 0405 by Alie Mccollum RN  Free From Fall Injury: Instruct family/caregiver on patient safety  Taken 3/15/2024 0005 by Alie Mccollum RN  Free From Fall Injury: Instruct family/caregiver on patient safety  Taken 3/14/2024 2005 by Alie Purcell RN  Free From Fall Injury:   Instruct family/caregiver on patient safety   Based on caregiver fall risk screen, instruct family/caregiver to ask for assistance with transferring infant if caregiver noted to have fall risk factors     Problem: Infection - Adult  Goal: Absence of fever/infection during anticipated neutropenic period  Outcome: Progressing  Flowsheets (Taken 3/15/2024 0405)  Absence of fever/infection during anticipated neutropenic period: Monitor white blood cell count     Problem: Infection - Adult  Goal: Absence of infection during hospitalization  Outcome: Progressing  Flowsheets  Taken 3/15/2024 0405 by Alie Mccollum RN  Absence of infection during hospitalization:   Assess and monitor for signs and symptoms of infection   Monitor lab/diagnostic results  Taken 3/15/2024 0005 by Alie Mccollum RN  Absence of infection during hospitalization:   Assess and monitor for signs and symptoms of infection   Monitor lab/diagnostic results  Taken 3/14/2024 2005 by Alie Purcell RN  Absence of infection during hospitalization:   Assess and monitor for signs and symptoms of infection   Instruct and encourage patient and family to use good hand hygiene technique     Problem: Infection - Adult  Goal: Absence of infection at discharge  Outcome: Progressing  Flowsheets (Taken 3/15/2024 0405)  Absence of infection at discharge:   Assess and monitor for signs and symptoms of infection   Monitor lab/diagnostic results     Problem: Pain  Goal: Verbalizes/displays adequate comfort level or baseline comfort level  Outcome: Progressing  Flowsheets  Taken 3/15/2024  0405  Verbalizes/displays adequate comfort level or baseline comfort level:   Encourage patient to monitor pain and request assistance   Assess pain using appropriate pain scale  Taken 3/15/2024 0005  Verbalizes/displays adequate comfort level or baseline comfort level:   Encourage patient to monitor pain and request assistance   Assess pain using appropriate pain scale     Problem: Postpartum  Goal: Experiences normal postpartum course  Description:  Postpartum OB-Pregnancy care plan goal which identifies if the mother is experiencing a normal postpartum course  Outcome: Progressing  Flowsheets (Taken 3/15/2024 0405)  Experiences Normal Postpartum Course:   Monitor maternal vital signs   Assess uterine involution  Goal: Appropriate maternal -  bonding  Description:  Postpartum OB-Pregnancy care plan goal which identifies if the mother and  are bonding appropriately  Outcome: Progressing  Note: Bonding with baby, participating in infant care.    Goal: Establishment of infant feeding pattern  Description:  Postpartum OB-Pregnancy care plan goal which identifies if the mother is establishing a feeding pattern with their   Outcome: Progressing  Flowsheets (Taken 3/15/2024 040)  Establishment of Infant Feeding Pattern:   Assess breast/bottle feeding   Refer to lactation as needed  Goal: Incisions, wounds, or drain sites healing without S/S of infection  Outcome: Progressing  Flowsheets (Taken 3/15/2024 0405)  Incisions, Wounds, or Drain Sites Healing Without Sign and Symptoms of Infection: ADMISSION and DAILY: Assess and document risk factors for pressure ulcer development     Problem: Discharge Planning  Goal: Discharge to home or other facility with appropriate resources  Outcome: Progressing  Flowsheets  Taken 3/15/2024 0405 by Alie Mccollum, RN  Discharge to home or other facility with appropriate resources: Identify barriers to discharge with patient and caregiver  Taken 3/15/2024 0005 by

## 2024-03-15 NOTE — FLOWSHEET NOTE
Lam drained 550 of clear yellow urine,  catheter removed, tip intact,  pt knows to call when needs to void,  abdominal dressing was changed per Dr order.

## 2024-03-15 NOTE — LACTATION NOTE
Pt. Is in SCN at this time.  Will continue to follow up with pt. PRN.  Pump was in pts. Room will continue to follow up with pt. PRN.

## 2024-03-15 NOTE — FLOWSHEET NOTE
Lam care and vicenta care performed.  New vicenta pad on. Abdominal binder applied for pt's comfort.  New gown on. Pt tolerated well.

## 2024-03-15 NOTE — L&D DELIVERY NOTE
AlannahJack Minoo [[id number]]      Labor Events     Labor: Yes   Steroids: None  Cervical Ripening Date/Time:      Antibiotics Received during Labor: Yes  Rupture Date/Time:  3/14/24 17:15:00   Rupture Type: SROM  Fluid Color: Clear  Fluid Odor: None  Fluid Volume: Large  Induction: None  Augmentation: None  Labor Complications:  Labor   Additional Complications:  OB: DELIVERY - COMPLICATIONS   Breech presentation             Anesthesia    Method: Spinal       Labor Event Times      Labor onset date/time:  3/14/24 17:15:00     Dilation complete date/time:        Start pushing date/time:     Decision date/time (emergent ):            Labor Length    3rd stage: 0h 02m       Delivery Details      Delivery Date: 3/14/24 Delivery Time: 20:51:00   Delivery Type: , Low Transverse  Trial of Labor?: No   Categorization: Primary   Priority: unscheduled  Indications for : Breech       Skin Incision Type: Pfannenstiel  Uterine Incision: Low Transverse       [place] Presentation    Presentation: Breech       Assisted Delivery Details    Forceps Attempted?: No  Vacuum Extractor Attempted?: No                           Cord    Vessels: 3 Vessels  Complications: None  Delayed Cord Clamping?: No  Cord Clamped Date/Time: 3/14/2024 20:51:30  Cord Blood Disposition: Lab              Placenta    Date/Time: 3/14/2024 20:53:00  Removal: Spontaneous  Appearance: Intact  Disposition: Placenta Refrigerator       Lacerations    Episiotomy: None  Perineal Lacerations: None  Other Lacerations: no non-perineal laceration  Number of Repair Packets: 0       Vaginal Counts    Initial Count Personnel: N/A  Initial Count Verified By: N/A  Final Count Personnel: N/A  Final Count Verified By: N/A       Blood Loss  Mother: Minoo Jaffe #[id number]     Start of Mother's Information      Delivery Blood Loss  24 1715 - 24 2144      Quantitative Blood Loss (mL)

## 2024-03-15 NOTE — L&D DELIVERY NOTE
Department of Obstetrics and Gynecology   Section Note    Pt Name: Minoo Jaffe  [id number]  YOB: 2005  Procedure Performed By: Amairani Yoo MD, MD    Indications: malpresentation - Breech, SROM    Pre-operative Diagnosis: 35 week pregnancy.  Post-operative Diagnosis:  Same, Delivered, Living newbornMale  Procedure:  primary low transverse  section  Findings:  Normal tubes, ovaries and uterus. Baby Male, Apgars  8,9 and weight of 5 lbs and 1 ounces.    Estimated Blood Loss:  300ml         Specimens: None     Complications:  None         Condition: infant stable to special care nursery and mother stable    Indications:     Minoo Jaffe is a 18 y.o. female  at 35w0d who presented for   section for  malpresentation - Breech PROM in labor.  She understood the  risks and benefits and signed informed consent.     Procedure:  The patient was taken to the Operating Room where spinal anesthesia was placed.  She was placed in the dorsal supine position with a leftward tilt and prepped and draped.  A Pfannenstiel incision was made with the scalpel taken down to the fascia.  The fascia was nicked in the midline.  This incision extended laterally.  The underlying rectus muscle dissected bluntly and with the Landers scissors.  The peritoneum identified and entered sharply.   This incision extended superiorly and inferior with good visualization of the bladder.  The vesicouterine peritoneum was identified and entered sharply.  This incision extended laterally and the bladder flap created digitally.  The uterus was incised in a low transverse fashion and extended digitally.  The double footling breech was delivered via normal breech extraction manuevers.  The cord was clamped and cut and the baby was stimulated.  Cord blood was obtained, the placenta delivered intact with a 3 vessel cord.  The uterus was exteriorized, cleared of all clots

## 2024-03-15 NOTE — ANESTHESIA POSTPROCEDURE EVALUATION
Department of Anesthesiology  Postprocedure Note    Patient: Minoo Jaffe  MRN: [id number]  YOB: 2005  Date of evaluation: 3/15/2024    Procedure Summary       Date: 24 Room / Location: [address]    Anesthesia Start:  Anesthesia Stop:     Procedure:  SECTION (Uterus) Diagnosis:       Delivery of pregnancy by  section      (Delivery of pregnancy by  section [O82])    Surgeons: Amairani Yoo MD Responsible Provider: Cam Petersen DO    Anesthesia Type: spinal ASA Status: 2            Anesthesia Type: No value filed.    Jaswinder Phase I: Jaswinder Score: 9    Jaswinder Phase II: Jaswinder Score: 10    Anesthesia Post Evaluation    Patient location during evaluation: floor  Patient participation: complete - patient participated  Level of consciousness: awake and alert  Airway patency: patent  Nausea & Vomiting: no nausea and no vomiting  Cardiovascular status: hemodynamically stable  Respiratory status: acceptable, room air and spontaneous ventilation  Hydration status: stable  Pain management: adequate        No notable events documented.

## 2024-03-15 NOTE — PROGRESS NOTES
Pt admitted to 5A16.   IV site free of s/s of infection or infiltration. Vital signs obtained. Assessment and data collection initiated. Oriented to room. Policies and procedures for 5AB explained. All questions answered with no further questions at this time. Fall prevention and safety discussed with patient. Call light in reach.     Explained patients right to have family, representative or physician notified of their admission.  Patient has Declined for physician to be notified.  Patient has Declined for family/representative to be notified.     room air

## 2024-03-15 NOTE — DISCHARGE INSTRUCTIONS
After Your  Delivery Discharge Instructions      After Discharge Orders:  No future appointments.   [unfilled]        Call the Physician with any  signs and symptoms:    Warning signs regarding incision:  \"Popping\" of stitches or staples  Foul smelling discharge or pus  More redness or streaks around incision than before    Incision care:  Keep incision dry and covered (if necessary)  No tub baths until OK'd by your Physician or Midwife  You may use cold compresses on incision site  Wash the incision daily with Hibiclens until the bottle is gone.      After your delivery - signs and symptoms to watch for:  Fever - Oral temperature greater than 100.4 degrees Fahrenheit  Foul-smelling vaginal discharge  Headache unrelieved by \"pain meds\"  Difficulty urinating  Breasts reddened, hard, hot to the touch  Nipple discharge which is foul-smelling or contains pus  Increased pain at the site of the surgical incision  Difficulty breathing with or without chest pain  New calf pain especially if only on one side  Sudden, continuing increased vaginal bleeding with or without clots  Unrelieved feelings of:  Inability to cope  Sadness  Anxiety  Lack of interest in baby  Insomnia  Crying     What to do at home:  See patient education handouts for full information  Resume activity gradually   Don't lift anything heavier than baby and carrier until OK'd by your Physician or Midwife  No sex until OK'd by your Physician or Midwife  Take care of yourself by sleeping/resting as much as possible  Eat regular nutritious meals  Let someone else care for you, your baby, and housework as much as possible   Take pain medication as prescribed whenever you need them  Wear compression stockings if prescribed   To avoid/relieve constipation take stool softeners if advised   Drink lots of water/fruit juices  Increase fiber in your diet  Breast care: Wear support bra ; use lanolin ointment/cream as needed  Do not drive until

## 2024-03-15 NOTE — DISCHARGE SUMMARY
C/Section Discharge Summary    Admitting diagnosis: IUP    Gestational Age:35w0d    Antepartum complications: none    Date of Admission: 3/14/2024  6:45 PM      Type of Delivery:  section - primary    Labs: CBC   Lab Results   Component Value Date    WBC 8.3 2024    HGB 11.7 (L) 03/15/2024    HCT 36.4 (L) 2024     2024        Intrapartum complications:  Labor and breech    Postpartum complications: none    The patient is ambulating well. The patient is tolerating a normal diet.    Discharge Medication:      Medication List        START taking these medications      ibuprofen 800 MG tablet  Commonly known as: ADVIL;MOTRIN  Take 1 tablet by mouth every 8 (eight) hours  Start taking on: 2024     oxyCODONE 5 MG immediate release tablet  Commonly known as: ROXICODONE  Take 1 tablet by mouth every 6 hours as needed for Pain for up to 7 days. Max Daily Amount: 20 mg            CONTINUE taking these medications      metoclopramide 10 MG tablet  Commonly known as: Reglan  1 tablet every 6-8 hours for nausea vomiting when necessary     pantoprazole 40 MG tablet  Commonly known as: PROTONIX  Take 1 tablet by mouth every morning (before breakfast)     PRENATAL 1 PO     scopolamine transdermal patch  Commonly known as: TRANSDERM-SCOP  Place 1 patch onto the skin every 72 hours            STOP taking these medications      potassium chloride 20 MEQ extended release tablet  Commonly known as: KLOR-CON M               Where to Get Your Medications        These medications were sent to [facility] [address] -  [phone] - F [phone]  [address] [address]      Phone: [phone]   ibuprofen 800 MG tablet  oxyCODONE 5 MG immediate release tablet          Patient Instructions:   Activity: activity as tolerated, no sex for 6 weeks, no driving while on analgesics, and no heavy lifting for 6 weeks  Diet:

## 2024-03-15 NOTE — H&P
[facility]  History and Physical Update    Pt Name: Minoo Jaffe  MRN: [id number]  YOB: 2005  Date of evaluation: 3/14/2024    [] I have examined the patient and reviewed the H&P/Consult and there are no changes to the patient or plans.    [x] I have examined the patient and reviewed the H&P/Consult and have noted the following changes:   19 yo  at 35 weeks with SROM and in labor and Breech.  FHT s cat 1  Pt understands needs for csection.  Will proceed.    Discussion with the patient and/ or family for proposed care, treatment, services; benefits, risks, side effects; likelihood of achieving goals and potential problems that may occur during recuperation was had and all questions were answered.  Discussion with the patient and/ or family of reasonable alternatives to the proposed care, treatment, services and the discussion of the risks, benefits, side effects related to the alternatives and the risk related to not receiving the proposed care treatment services was also had and all questions were answered.    If this is for an elective surgical procedure then The patient was counseled at length about the risks of kamilah Covid-19 during their perioperative period and any recovery window from their procedure.  The patient was made aware that kamilah Covid-19  may worsen their prognosis for recovering from their procedure  and lend to a higher morbidity and/or mortality risk.  All material risks, benefits, and reasonable alternatives including postponing the procedure were discussed. The patient  does wish to proceed with the procedure at this time.             Amairani Yoo MD,MD  Electronically signed 3/14/2024 at 9:41 PM

## 2024-03-15 NOTE — ANESTHESIA PROCEDURE NOTES
Spinal Block    Patient location during procedure: OR  End time: 3/14/2024 8:34 PM  Reason for block: primary anesthetic  Staffing  Performed: resident/CRNA   Anesthesiologist: Cam Petersen DO  Resident/CRNA: Lisa Sevilla APRN - CRNA  Performed by: Cam Petersen DO  Authorized by: Cam Petersen DO    Spinal Block  Patient position: sitting  Prep: ChloraPrep  Patient monitoring: cardiac monitor, continuous pulse ox and frequent blood pressure checks  Approach: midline  Location: L4/L5  Guidance: paresthesia technique  Provider prep: mask  Local infiltration: lidocaine  Needle  Needle type: Pencan   Needle gauge: 25 G  Needle length: 3.5 in  Assessment  Swirl obtained: Yes  CSF: clear  Attempts: 1  Hemodynamics: stable  Preanesthetic Checklist  Completed: patient identified, IV checked, site marked, risks and benefits discussed, surgical/procedural consents, equipment checked, pre-op evaluation, timeout performed, anesthesia consent given, oxygen available, monitors applied/VS acknowledged, fire risk safety assessment completed and verbalized and blood product R/B/A discussed and consented

## 2024-03-16 LAB — GP B STREP VAG+RECTUM QL CULT: NORMAL

## 2024-03-16 PROCEDURE — 6370000000 HC RX 637 (ALT 250 FOR IP): Performed by: OBSTETRICS & GYNECOLOGY

## 2024-03-16 PROCEDURE — 1200000000 HC SEMI PRIVATE

## 2024-03-16 PROCEDURE — 85027 COMPLETE CBC AUTOMATED: CPT

## 2024-03-16 PROCEDURE — 36415 COLL VENOUS BLD VENIPUNCTURE: CPT

## 2024-03-16 RX ORDER — KETOROLAC TROMETHAMINE 30 MG/ML
30 INJECTION, SOLUTION INTRAMUSCULAR; INTRAVENOUS EVERY 6 HOURS
Status: DISCONTINUED | OUTPATIENT
Start: 2024-03-16 | End: 2024-03-16

## 2024-03-16 RX ORDER — IBUPROFEN 800 MG/1
800 TABLET ORAL EVERY 8 HOURS
Status: DISCONTINUED | OUTPATIENT
Start: 2024-03-16 | End: 2024-03-19 | Stop reason: HOSPADM

## 2024-03-16 RX ORDER — KETOROLAC TROMETHAMINE 30 MG/ML
30 INJECTION, SOLUTION INTRAMUSCULAR; INTRAVENOUS EVERY 6 HOURS
Status: ACTIVE | OUTPATIENT
Start: 2024-03-16 | End: 2024-03-16

## 2024-03-16 RX ORDER — IBUPROFEN 800 MG/1
800 TABLET ORAL EVERY 8 HOURS
Status: DISCONTINUED | OUTPATIENT
Start: 2024-03-16 | End: 2024-03-16

## 2024-03-16 RX ADMIN — IBUPROFEN 800 MG: 800 TABLET, FILM COATED ORAL at 10:45

## 2024-03-16 RX ADMIN — OXYCODONE 10 MG: 5 TABLET ORAL at 08:11

## 2024-03-16 RX ADMIN — OXYCODONE 10 MG: 5 TABLET ORAL at 16:56

## 2024-03-16 RX ADMIN — OXYCODONE 10 MG: 5 TABLET ORAL at 12:49

## 2024-03-16 RX ADMIN — ACETAMINOPHEN 1000 MG: 500 TABLET ORAL at 16:55

## 2024-03-16 RX ADMIN — OXYCODONE 10 MG: 5 TABLET ORAL at 02:00

## 2024-03-16 RX ADMIN — IBUPROFEN 800 MG: 800 TABLET, FILM COATED ORAL at 20:53

## 2024-03-16 RX ADMIN — DOCUSATE SODIUM 100 MG: 100 CAPSULE, LIQUID FILLED ORAL at 08:12

## 2024-03-16 RX ADMIN — OXYCODONE 10 MG: 5 TABLET ORAL at 20:53

## 2024-03-16 RX ADMIN — ACETAMINOPHEN 1000 MG: 500 TABLET ORAL at 02:00

## 2024-03-16 RX ADMIN — DOCUSATE SODIUM 100 MG: 100 CAPSULE, LIQUID FILLED ORAL at 20:52

## 2024-03-16 ASSESSMENT — PAIN DESCRIPTION - ORIENTATION
ORIENTATION: LOWER
ORIENTATION: MID;LOWER
ORIENTATION: LOWER
ORIENTATION: LOWER

## 2024-03-16 ASSESSMENT — PAIN - FUNCTIONAL ASSESSMENT
PAIN_FUNCTIONAL_ASSESSMENT: ACTIVITIES ARE NOT PREVENTED

## 2024-03-16 ASSESSMENT — PAIN DESCRIPTION - PAIN TYPE
TYPE: SURGICAL PAIN
TYPE: ACUTE PAIN;SURGICAL PAIN

## 2024-03-16 ASSESSMENT — PAIN DESCRIPTION - DESCRIPTORS
DESCRIPTORS: ACHING;BURNING
DESCRIPTORS: ACHING;BURNING
DESCRIPTORS: BURNING
DESCRIPTORS: ACHING;DISCOMFORT
DESCRIPTORS: ACHING;DISCOMFORT
DESCRIPTORS: ACHING

## 2024-03-16 ASSESSMENT — PAIN DESCRIPTION - FREQUENCY
FREQUENCY: INTERMITTENT
FREQUENCY: INTERMITTENT

## 2024-03-16 ASSESSMENT — PAIN SCALES - GENERAL
PAINLEVEL_OUTOF10: 7
PAINLEVEL_OUTOF10: 8
PAINLEVEL_OUTOF10: 4
PAINLEVEL_OUTOF10: 5
PAINLEVEL_OUTOF10: 7
PAINLEVEL_OUTOF10: 7

## 2024-03-16 ASSESSMENT — PAIN DESCRIPTION - ONSET
ONSET: ON-GOING
ONSET: ON-GOING

## 2024-03-16 ASSESSMENT — PAIN DESCRIPTION - LOCATION
LOCATION: ABDOMEN;INCISION
LOCATION: ABDOMEN
LOCATION: ABDOMEN;INCISION

## 2024-03-16 NOTE — PLAN OF CARE
Care plan reviewed with patient .  Patient   Problem: Postpartum  Goal: Experiences normal postpartum course  Description:  Postpartum OB-Pregnancy care plan goal which identifies if the mother is experiencing a normal postpartum course  Outcome: Progressing  Flowsheets (Taken 3/15/2024 1940 by Alie Mariscal, RN)  Experiences Normal Postpartum Course: Monitor maternal vital signs  Goal: Appropriate maternal -  bonding  Description:  Postpartum OB-Pregnancy care plan goal which identifies if the mother and  are bonding appropriately  Outcome: Progressing  Goal: Establishment of infant feeding pattern  Description:  Postpartum OB-Pregnancy care plan goal which identifies if the mother is establishing a feeding pattern with their   Outcome: Progressing  Goal: Incisions, wounds, or drain sites healing without S/S of infection  Outcome: Progressing     Problem: Pain  Goal: Verbalizes/displays adequate comfort level or baseline comfort level  Outcome: Progressing  Flowsheets (Taken 3/15/2024 1940 by Alie Mariscal, RN)  Verbalizes/displays adequate comfort level or baseline comfort level: Encourage patient to monitor pain and request assistance     Problem: Infection - Adult  Goal: Absence of infection at discharge  Outcome: Progressing  Flowsheets (Taken 3/15/2024 1940 by Alie Mariscal, RN)  Absence of infection at discharge: Assess and monitor for signs and symptoms of infection  Goal: Absence of infection during hospitalization  Outcome: Progressing  Goal: Absence of fever/infection during anticipated neutropenic period  Outcome: Progressing     Problem: Safety - Adult  Goal: Free from fall injury  Outcome: Progressing  Flowsheets (Taken 3/15/2024 1940 by Alie Mariscal, RN)  Free From Fall Injury: Instruct family/caregiver on patient safety     Problem: Discharge Planning  Goal: Discharge to home or other facility with appropriate resources  Outcome: Progressing  Flowsheets (Taken 3/15/2024

## 2024-03-16 NOTE — PLAN OF CARE
Problem: Postpartum  Goal: Experiences normal postpartum course  Description:  Postpartum OB-Pregnancy care plan goal which identifies if the mother is experiencing a normal postpartum course  Outcome: Progressing  Flowsheets  Taken 3/15/2024 1940 by Alie Mariscal RN  Experiences Normal Postpartum Course: Monitor maternal vital signs  Taken 3/15/2024 0811 by Dianelys Ridley RN  Experiences Normal Postpartum Course: Monitor maternal vital signs  Goal: Appropriate maternal -  bonding  Description:  Postpartum OB-Pregnancy care plan goal which identifies if the mother and  are bonding appropriately  Outcome: Progressing  Note: Bonding with baby, participating in infant care.    Goal: Establishment of infant feeding pattern  Description:  Postpartum OB-Pregnancy care plan goal which identifies if the mother is establishing a feeding pattern with their   Outcome: Progressing  Flowsheets  Taken 3/15/2024 1940 by Alie Mariscal RN  Establishment of Infant Feeding Pattern: Assess breast/bottle feeding  Taken 3/15/2024 0811 by Dianelys Ridley RN  Establishment of Infant Feeding Pattern: Assess breast/bottle feeding  Goal: Incisions, wounds, or drain sites healing without S/S of infection  Outcome: Progressing  Flowsheets (Taken 3/15/2024 1940)  Incisions, Wounds, or Drain Sites Healing Without Sign and Symptoms of Infection: TWICE DAILY: Assess and document skin integrity     Problem: Pain  Goal: Verbalizes/displays adequate comfort level or baseline comfort level  Outcome: Progressing  Flowsheets  Taken 3/15/2024 1940 by Alie Mariscal RN  Verbalizes/displays adequate comfort level or baseline comfort level: Encourage patient to monitor pain and request assistance  Taken 3/15/2024 08 by Dianelys Ridley RN  Verbalizes/displays adequate comfort level or baseline comfort level: Encourage patient to monitor pain and request assistance     Problem: Infection - Adult  Goal: Absence of infection at

## 2024-03-16 NOTE — PROGRESS NOTES
Subjective:     Postpartum Day 2:  Delivery    Doing well, baby in SCN    Objective:    VITALS:  BP (!) 114/59   Pulse 82   Temp 97.6 °F (36.4 °C) (Oral)   Resp 16   Wt 81.2 kg (179 lb)   SpO2 100%   Breastfeeding Unknown   BMI 30.73 kg/m²     Vitals:    24 0801   BP: (!) 114/59   Pulse: 82   Resp: 16   Temp: 97.6 °F (36.4 °C)   SpO2: 100%         General:    alert, appears stated age, and cooperative   Bowel Sounds:  active           Incision:  healing well, no significant drainage, no dehiscence, no significant erythema         DATA: CBC   Lab Results   Component Value Date    WBC 8.3 2024    HGB 11.7 (L) 03/15/2024    HCT 36.4 (L) 2024     2024        Assessment:     Status post  section. Doing well postoperatively.     Plan:     Continue current care.      Dianelys Durbin MD

## 2024-03-17 LAB
DEPRECATED RDW RBC AUTO: 40.5 FL (ref 35–45)
ERYTHROCYTE [DISTWIDTH] IN BLOOD BY AUTOMATED COUNT: 13 % (ref 11.5–14.5)
HCT VFR BLD AUTO: 33.1 % (ref 37–47)
HGB BLD-MCNC: 11.1 GM/DL (ref 12–16)
MCH RBC QN AUTO: 29.1 PG (ref 26–33)
MCHC RBC AUTO-ENTMCNC: 33.5 GM/DL (ref 32.2–35.5)
MCV RBC AUTO: 86.9 FL (ref 81–99)
PLATELET # BLD AUTO: 333 THOU/MM3 (ref 130–400)
PMV BLD AUTO: 10.6 FL (ref 9.4–12.4)
RBC # BLD AUTO: 3.81 MILL/MM3 (ref 4.2–5.4)
WBC # BLD AUTO: 9.7 THOU/MM3 (ref 4.8–10.8)

## 2024-03-17 PROCEDURE — 6370000000 HC RX 637 (ALT 250 FOR IP): Performed by: OBSTETRICS & GYNECOLOGY

## 2024-03-17 PROCEDURE — 1200000000 HC SEMI PRIVATE

## 2024-03-17 RX ADMIN — DOCUSATE SODIUM 100 MG: 100 CAPSULE, LIQUID FILLED ORAL at 19:58

## 2024-03-17 RX ADMIN — ACETAMINOPHEN 1000 MG: 500 TABLET ORAL at 02:59

## 2024-03-17 RX ADMIN — OXYCODONE 10 MG: 5 TABLET ORAL at 02:58

## 2024-03-17 RX ADMIN — DOCUSATE SODIUM 100 MG: 100 CAPSULE, LIQUID FILLED ORAL at 08:01

## 2024-03-17 RX ADMIN — ACETAMINOPHEN 1000 MG: 500 TABLET ORAL at 19:57

## 2024-03-17 RX ADMIN — IBUPROFEN 800 MG: 800 TABLET, FILM COATED ORAL at 07:59

## 2024-03-17 RX ADMIN — ACETAMINOPHEN 1000 MG: 500 TABLET ORAL at 12:38

## 2024-03-17 RX ADMIN — OXYCODONE 10 MG: 5 TABLET ORAL at 19:58

## 2024-03-17 RX ADMIN — IBUPROFEN 800 MG: 800 TABLET, FILM COATED ORAL at 17:04

## 2024-03-17 RX ADMIN — OXYCODONE 10 MG: 5 TABLET ORAL at 12:37

## 2024-03-17 RX ADMIN — OXYCODONE 10 MG: 5 TABLET ORAL at 08:01

## 2024-03-17 ASSESSMENT — PAIN DESCRIPTION - DESCRIPTORS
DESCRIPTORS: DISCOMFORT
DESCRIPTORS: ACHING
DESCRIPTORS: ACHING;BURNING
DESCRIPTORS: ACHING;BURNING

## 2024-03-17 ASSESSMENT — PAIN DESCRIPTION - LOCATION
LOCATION: ABDOMEN;INCISION

## 2024-03-17 ASSESSMENT — PAIN DESCRIPTION - ONSET: ONSET: ON-GOING

## 2024-03-17 ASSESSMENT — PAIN SCALES - GENERAL
PAINLEVEL_OUTOF10: 4
PAINLEVEL_OUTOF10: 7
PAINLEVEL_OUTOF10: 7
PAINLEVEL_OUTOF10: 0
PAINLEVEL_OUTOF10: 7

## 2024-03-17 ASSESSMENT — PAIN DESCRIPTION - ORIENTATION
ORIENTATION: MID;LOWER
ORIENTATION: LOWER

## 2024-03-17 ASSESSMENT — PAIN - FUNCTIONAL ASSESSMENT
PAIN_FUNCTIONAL_ASSESSMENT: ACTIVITIES ARE NOT PREVENTED

## 2024-03-17 ASSESSMENT — PAIN DESCRIPTION - FREQUENCY: FREQUENCY: INTERMITTENT

## 2024-03-17 ASSESSMENT — PAIN DESCRIPTION - PAIN TYPE: TYPE: SURGICAL PAIN

## 2024-03-17 NOTE — PROGRESS NOTES
Subjective:     Postpartum Day 3:  Delivery    Pt in SCN this morning seeing baby    Objective:    VITALS:  /76   Pulse 80   Temp 97.9 °F (36.6 °C) (Oral)   Resp 18   Wt 81.2 kg (179 lb)   SpO2 100%   Breastfeeding Unknown   BMI 30.73 kg/m²     Vitals:    24 0748   BP: 119/76   Pulse: 80   Resp: 18   Temp: 97.9 °F (36.6 °C)   SpO2: 100%         General:       Bowel Sounds:             Incision:           DATA: CBC   Lab Results   Component Value Date    WBC 9.7 2024    HGB 11.1 (L) 2024    HCT 33.1 (L) 2024     2024        Assessment:     Status post  section. Doing well postoperatively.     Plan:     Continue current care.  No needs per RN. Will check on her in SCN    Dianelys Durbin MD

## 2024-03-17 NOTE — PLAN OF CARE
Care plan reviewed with patient .  Patient   Problem: Postpartum  Goal: Experiences normal postpartum course  Description:  Postpartum OB-Pregnancy care plan goal which identifies if the mother is experiencing a normal postpartum course  Outcome: Progressing  Flowsheets (Taken 3/17/2024 0748)  Experiences Normal Postpartum Course: Monitor maternal vital signs  Goal: Appropriate maternal -  bonding  Description:  Postpartum OB-Pregnancy care plan goal which identifies if the mother and  are bonding appropriately  Outcome: Progressing  Goal: Establishment of infant feeding pattern  Description:  Postpartum OB-Pregnancy care plan goal which identifies if the mother is establishing a feeding pattern with their   Outcome: Progressing  Flowsheets (Taken 3/17/2024 0748)  Establishment of Infant Feeding Pattern: Assess breast/bottle feeding  Goal: Incisions, wounds, or drain sites healing without S/S of infection  Outcome: Progressing  Flowsheets (Taken 3/16/2024 2030 by Alie Mariscal, RN)  Incisions, Wounds, or Drain Sites Healing Without Sign and Symptoms of Infection: TWICE DAILY: Assess and document skin integrity     Problem: Pain  Goal: Verbalizes/displays adequate comfort level or baseline comfort level  Outcome: Progressing  Flowsheets (Taken 3/17/2024 0748)  Verbalizes/displays adequate comfort level or baseline comfort level: Encourage patient to monitor pain and request assistance     Problem: Infection - Adult  Goal: Absence of infection at discharge  Outcome: Progressing  Flowsheets (Taken 3/16/2024 2030 by Alie Mariscal, RN)  Absence of infection at discharge: Assess and monitor for signs and symptoms of infection  Goal: Absence of infection during hospitalization  Outcome: Progressing  Flowsheets (Taken 3/16/2024 2030 by Alie Mariscal, RN)  Absence of infection during hospitalization: Assess and monitor for signs and symptoms of infection  Goal: Absence of fever/infection during  anticipated neutropenic period  Outcome: Progressing     Problem: Safety - Adult  Goal: Free from fall injury  Outcome: Progressing  Flowsheets (Taken 3/16/2024 2030 by Alie Mariscal, RN)  Free From Fall Injury: Instruct family/caregiver on patient safety     Problem: Discharge Planning  Goal: Discharge to home or other facility with appropriate resources  Outcome: Progressing  Flowsheets (Taken 3/16/2024 2030 by Alie Mariscal, RN)  Discharge to home or other facility with appropriate resources: Identify barriers to discharge with patient and caregiver     Problem: Chronic Conditions and Co-morbidities  Goal: Patient's chronic conditions and co-morbidity symptoms are monitored and maintained or improved  Outcome: Progressing  Flowsheets (Taken 3/15/2024 2040 by Alie Mariscal, RN)  Care Plan - Patient's Chronic Conditions and Co-Morbidity Symptoms are Monitored and Maintained or Improved: Monitor and assess patient's chronic conditions and comorbid symptoms for stability, deterioration, or improvement    verbalize understanding of the plan of care and contribute to goal setting.

## 2024-03-17 NOTE — PLAN OF CARE
Problem: Postpartum  Goal: Experiences normal postpartum course  Description:  Postpartum OB-Pregnancy care plan goal which identifies if the mother is experiencing a normal postpartum course  3/16/2024 2156 by Alie Mariscal RN  Outcome: Progressing  Flowsheets (Taken 3/16/2024 2030)  Experiences Normal Postpartum Course: Monitor maternal vital signs  3/16/2024 1436 by Dianelys Ridley RN  Outcome: Progressing  Flowsheets (Taken 3/15/2024 1940 by Alie Mariscal RN)  Experiences Normal Postpartum Course: Monitor maternal vital signs  Goal: Appropriate maternal -  bonding  Description:  Postpartum OB-Pregnancy care plan goal which identifies if the mother and  are bonding appropriately  3/16/2024 2156 by Alie Mariscal RN  Outcome: Progressing  Note: Bonding with baby, participating in infant care.    3/16/2024 1436 by Dianelys Ridley RN  Outcome: Progressing  Goal: Establishment of infant feeding pattern  Description:  Postpartum OB-Pregnancy care plan goal which identifies if the mother is establishing a feeding pattern with their   3/16/2024 2156 by Alie Mariscal RN  Outcome: Progressing  Flowsheets (Taken 3/16/2024 2030)  Establishment of Infant Feeding Pattern: Assess breast/bottle feeding  3/16/2024 1436 by Dianelys Ridley RN  Outcome: Progressing  Goal: Incisions, wounds, or drain sites healing without S/S of infection  3/16/2024 2156 by Alie Mariscal RN  Outcome: Progressing  Flowsheets (Taken 3/16/2024 2030)  Incisions, Wounds, or Drain Sites Healing Without Sign and Symptoms of Infection: TWICE DAILY: Assess and document skin integrity  3/16/2024 1436 by Dianelys iRdley RN  Outcome: Progressing     Problem: Pain  Goal: Verbalizes/displays adequate comfort level or baseline comfort level  3/16/2024 2156 by Alie Mariscal RN  Outcome: Progressing  Flowsheets (Taken 3/16/2024 2030)  Verbalizes/displays adequate comfort level or baseline comfort level: Encourage patient to

## 2024-03-18 VITALS
DIASTOLIC BLOOD PRESSURE: 72 MMHG | TEMPERATURE: 97.9 F | RESPIRATION RATE: 18 BRPM | BODY MASS INDEX: 30.73 KG/M2 | HEART RATE: 72 BPM | OXYGEN SATURATION: 100 % | WEIGHT: 179 LBS | SYSTOLIC BLOOD PRESSURE: 117 MMHG

## 2024-03-18 PROCEDURE — 6370000000 HC RX 637 (ALT 250 FOR IP): Performed by: OBSTETRICS & GYNECOLOGY

## 2024-03-18 PROCEDURE — 1200000000 HC SEMI PRIVATE

## 2024-03-18 RX ADMIN — OXYCODONE 10 MG: 5 TABLET ORAL at 17:04

## 2024-03-18 RX ADMIN — OXYCODONE 10 MG: 5 TABLET ORAL at 12:57

## 2024-03-18 RX ADMIN — IBUPROFEN 800 MG: 800 TABLET, FILM COATED ORAL at 21:39

## 2024-03-18 RX ADMIN — ACETAMINOPHEN 1000 MG: 500 TABLET ORAL at 08:10

## 2024-03-18 RX ADMIN — DOCUSATE SODIUM 100 MG: 100 CAPSULE, LIQUID FILLED ORAL at 08:09

## 2024-03-18 RX ADMIN — OXYCODONE 10 MG: 5 TABLET ORAL at 00:56

## 2024-03-18 RX ADMIN — IBUPROFEN 800 MG: 800 TABLET, FILM COATED ORAL at 12:56

## 2024-03-18 RX ADMIN — OXYCODONE 10 MG: 5 TABLET ORAL at 21:40

## 2024-03-18 RX ADMIN — IBUPROFEN 800 MG: 800 TABLET, FILM COATED ORAL at 00:55

## 2024-03-18 RX ADMIN — ACETAMINOPHEN 1000 MG: 500 TABLET ORAL at 17:04

## 2024-03-18 RX ADMIN — OXYCODONE 10 MG: 5 TABLET ORAL at 08:09

## 2024-03-18 RX ADMIN — PRENATAL WITH FERROUS FUM AND FOLIC ACID 1 TABLET: 3080; 920; 120; 400; 22; 1.84; 3; 20; 10; 1; 12; 200; 27; 25; 2 TABLET ORAL at 12:56

## 2024-03-18 ASSESSMENT — PAIN DESCRIPTION - FREQUENCY
FREQUENCY: CONTINUOUS
FREQUENCY: CONTINUOUS

## 2024-03-18 ASSESSMENT — PAIN DESCRIPTION - DESCRIPTORS
DESCRIPTORS: BURNING;DISCOMFORT
DESCRIPTORS: BURNING;DISCOMFORT
DESCRIPTORS: DISCOMFORT;BURNING
DESCRIPTORS: CRAMPING

## 2024-03-18 ASSESSMENT — PAIN DESCRIPTION - ONSET
ONSET: ON-GOING
ONSET: ON-GOING

## 2024-03-18 ASSESSMENT — PAIN - FUNCTIONAL ASSESSMENT
PAIN_FUNCTIONAL_ASSESSMENT: ACTIVITIES ARE NOT PREVENTED

## 2024-03-18 ASSESSMENT — PAIN DESCRIPTION - ORIENTATION
ORIENTATION: LOWER

## 2024-03-18 ASSESSMENT — PAIN SCALES - GENERAL
PAINLEVEL_OUTOF10: 3
PAINLEVEL_OUTOF10: 7
PAINLEVEL_OUTOF10: 7
PAINLEVEL_OUTOF10: 4
PAINLEVEL_OUTOF10: 7
PAINLEVEL_OUTOF10: 7

## 2024-03-18 ASSESSMENT — PAIN DESCRIPTION - LOCATION
LOCATION: ABDOMEN;INCISION
LOCATION: ABDOMEN
LOCATION: ABDOMEN;INCISION
LOCATION: ABDOMEN;INCISION

## 2024-03-18 ASSESSMENT — PAIN DESCRIPTION - PAIN TYPE
TYPE: ACUTE PAIN
TYPE: ACUTE PAIN

## 2024-03-18 NOTE — PLAN OF CARE
Problem: Postpartum  Goal: Experiences normal postpartum course  Description:  Postpartum OB-Pregnancy care plan goal which identifies if the mother is experiencing a normal postpartum course  3/17/2024 2004 by Juan Szymanski RN  Outcome: Progressing  Flowsheets  Taken 3/17/2024 2004  Experiences Normal Postpartum Course:   Monitor maternal vital signs   Assess uterine involution  Taken 3/17/2024 1955  Experiences Normal Postpartum Course: Monitor maternal vital signs     Problem: Postpartum  Goal: Appropriate maternal -  bonding  Description:  Postpartum OB-Pregnancy care plan goal which identifies if the mother and  are bonding appropriately  3/17/2024 2004 by Juan Szymanski, RN  Outcome: Progressing  Note: Infant has roomed in with mother this shift except for maternal exhaustion. Benefits of rooming in discussed.        Problem: Postpartum  Goal: Establishment of infant feeding pattern  Description:  Postpartum OB-Pregnancy care plan goal which identifies if the mother is establishing a feeding pattern with their   3/17/2024 2004 by Juan Szymanski, RN  Outcome: Progressing  Flowsheets  Taken 3/17/2024 2004  Establishment of Infant Feeding Pattern: Assess breast/bottle feeding  Taken 3/17/2024 1955  Establishment of Infant Feeding Pattern: Assess breast/bottle feeding     Problem: Postpartum  Goal: Incisions, wounds, or drain sites healing without S/S of infection  3/17/2024 2004 by Juan Szymanski RN  Outcome: Progressing  Flowsheets  Taken 3/17/2024 2004  Incisions, Wounds, or Drain Sites Healing Without Sign and Symptoms of Infection: TWICE DAILY: Assess and document skin integrity  Taken 3/17/2024 1955  Incisions, Wounds, or Drain Sites Healing Without Sign and Symptoms of Infection: TWICE DAILY: Assess and document skin integrity     Problem: Pain  Goal: Verbalizes/displays adequate comfort level or baseline comfort level  3/17/2024 2004 by Juan Szymanski, RN  Outcome:  by Juan Szymanski RN  Absence of infection during hospitalization: Assess and monitor for signs and symptoms of infection     Problem: Infection - Adult  Goal: Absence of fever/infection during anticipated neutropenic period  Recent Flowsheet Documentation  Taken 3/17/2024 2004 by Juan Szymanski RN  Absence of fever/infection during anticipated neutropenic period: Monitor white blood cell count  Taken 3/17/2024 1955 by Juan Szymanski RN  Absence of fever/infection during anticipated neutropenic period: Monitor white blood cell count     Problem: Safety - Adult  Goal: Free from fall injury  Recent Flowsheet Documentation  Taken 3/17/2024 2004 by Juan Szymanski RN  Free From Fall Injury: Instruct family/caregiver on patient safety  Taken 3/17/2024 1955 by Juan Szymanski RN  Free From Fall Injury: Instruct family/caregiver on patient safety     Problem: Discharge Planning  Goal: Discharge to home or other facility with appropriate resources  Recent Flowsheet Documentation  Taken 3/17/2024 2004 by Juan Szymanski RN  Discharge to home or other facility with appropriate resources: Identify barriers to discharge with patient and caregiver  Taken 3/17/2024 1955 by Juan Szymanski RN  Discharge to home or other facility with appropriate resources: Identify barriers to discharge with patient and caregiver   Care plan reviewed with patient and significant other.  Patient and significant other verbalize understanding of the plan of care and contribute to goal setting.

## 2024-03-18 NOTE — LACTATION NOTE
Met with pt in room. Pt planning to pump only and not latch baby. Educated about frequency of pumping to build and maintain milk supply. Offered support prn.  Pt denies concerns at this time.

## 2024-03-18 NOTE — PROGRESS NOTES
Progress note    Subjective:     Postoperative Day 4:  Delivery    Pain is well controlled with current medications.  The patient is ambulating well. The patient is tolerating a normal diet.    Objective:     Vitals:    24 0052   BP: 95/67   Pulse: 75   Resp: 16   Temp: 98.1 °F (36.7 °C)   SpO2: 100%     2    General:    alert, appears stated age, and cooperative   Uterine Fundus:   firm   Incision:  covered        CBC   Lab Results   Component Value Date    WBC 9.7 2024    HGB 11.1 (L) 2024    HCT 33.1 (L) 2024     2024        Assessment:     Status post  section. Doing well postoperatively.      Plan:     Discharge home with standard precautions and return to clinic in 1 weeks    Lise Mcnally MD 3/18/2024 8:54 AM

## 2024-03-18 NOTE — PLAN OF CARE
Problem: Postpartum  Goal: Experiences normal postpartum course  Description:  Postpartum OB-Pregnancy care plan goal which identifies if the mother is experiencing a normal postpartum course  Outcome: Progressing  Flowsheets (Taken 3/18/2024 0809)  Experiences Normal Postpartum Course:   Monitor maternal vital signs   Assess uterine involution     Problem: Postpartum  Goal: Appropriate maternal -  bonding  Description:  Postpartum OB-Pregnancy care plan goal which identifies if the mother and  are bonding appropriately  Outcome: Progressing  Note: Infant has roomed in with mother this shift except when infant was transferred back to [facility] today for temperature instability .        Problem: Postpartum  Goal: Establishment of infant feeding pattern  Description:  Postpartum OB-Pregnancy care plan goal which identifies if the mother is establishing a feeding pattern with their   Outcome: Progressing  Flowsheets (Taken 3/18/2024 0809)  Establishment of Infant Feeding Pattern:   Assess breast/bottle feeding   Refer to lactation as needed     Problem: Postpartum  Goal: Incisions, wounds, or drain sites healing without S/S of infection  Outcome: Progressing  Flowsheets (Taken 3/18/2024 0809)  Incisions, Wounds, or Drain Sites Healing Without Sign and Symptoms of Infection: TWICE DAILY: Assess and document skin integrity     Problem: Pain  Goal: Verbalizes/displays adequate comfort level or baseline comfort level  Outcome: Progressing  Flowsheets (Taken 3/18/2024 0809)  Verbalizes/displays adequate comfort level or baseline comfort level:   Encourage patient to monitor pain and request assistance   Assess pain using appropriate pain scale   Administer analgesics based on type and severity of pain and evaluate response   Implement non-pharmacological measures as appropriate and evaluate response  Note: Pain medication given as ordered for incisional discomfort. Pt satisfied and comfortable with pain  although pain been above pain goal of a 2/10.      Problem: Infection - Adult  Goal: Absence of infection at discharge  Outcome: Progressing  Flowsheets (Taken 3/18/2024 0809)  Absence of infection at discharge: Assess and monitor for signs and symptoms of infection     Problem: Infection - Adult  Goal: Absence of infection during hospitalization  Outcome: Progressing  Flowsheets (Taken 3/18/2024 0809)  Absence of infection during hospitalization:   Assess and monitor for signs and symptoms of infection   Monitor lab/diagnostic results     Problem: Infection - Adult  Goal: Absence of fever/infection during anticipated neutropenic period  Outcome: Progressing  Flowsheets (Taken 3/18/2024 0809)  Absence of fever/infection during anticipated neutropenic period: Monitor white blood cell count     Problem: Safety - Adult  Goal: Free from fall injury  Outcome: Progressing  Flowsheets (Taken 3/18/2024 0809)  Free From Fall Injury: Instruct family/caregiver on patient safety     Problem: Chronic Conditions and Co-morbidities  Goal: Patient's chronic conditions and co-morbidity symptoms are monitored and maintained or improved  Outcome: Progressing  Flowsheets (Taken 3/17/2024 2004 by Juan Szymanski, RN)  Care Plan - Patient's Chronic Conditions and Co-Morbidity Symptoms are Monitored and Maintained or Improved: Monitor and assess patient's chronic conditions and comorbid symptoms for stability, deterioration, or improvement   Plan of care discussed with mother and she contributes to goal setting and voices understanding of plan of care.

## 2024-03-18 NOTE — FLOWSHEET NOTE
Post birth warning signs education paper given and reviewed, teaching complete. [place] postpartum depression screening discussed with patient, instructed to contact her healthcare provider if her score is > 10. Patient voiced understanding.  Mother's blood type is O+.  Baby's blood type is N/A.  Mother did not receive Rhogam.

## 2024-03-18 NOTE — DISCHARGE SUMMARY
C/Section Discharge Summary    Admitting diagnosis: IUP    Gestational Age:35w0d    Antepartum complications: non    Date of Admission: 3/14/2024  6:45 PM      Type of Delivery:  section - primary    Labs: CBC   Lab Results   Component Value Date    WBC 9.7 2024    HGB 11.1 (L) 2024    HCT 33.1 (L) 2024     2024        Intrapartum complications:  labor, breech    Postpartum complications: none    The patient is ambulating well. The patient is tolerating a normal diet.    Discharge Medication:      Medication List        START taking these medications      ibuprofen 800 MG tablet  Commonly known as: ADVIL;MOTRIN  Take 1 tablet by mouth every 8 (eight) hours     oxyCODONE 5 MG immediate release tablet  Commonly known as: ROXICODONE  Take 1 tablet by mouth every 6 hours as needed for Pain for up to 7 days. Max Daily Amount: 20 mg            CONTINUE taking these medications      metoclopramide 10 MG tablet  Commonly known as: Reglan  1 tablet every 6-8 hours for nausea vomiting when necessary     pantoprazole 40 MG tablet  Commonly known as: PROTONIX  Take 1 tablet by mouth every morning (before breakfast)     PRENATAL 1 PO     scopolamine transdermal patch  Commonly known as: TRANSDERM-SCOP  Place 1 patch onto the skin every 72 hours            STOP taking these medications      potassium chloride 20 MEQ extended release tablet  Commonly known as: KLOR-CON M               Where to Get Your Medications        These medications were sent to [facility] [address] -  [phone] - F [phone]  [address] [address]      Phone: [phone]   ibuprofen 800 MG tablet  oxyCODONE 5 MG immediate release tablet          Patient Instructions:   Activity: activity as tolerated, no sex for 6 weeks, and no driving while on analgesics  Diet: regular  Wound Care: as directed    Discharge Date:

## 2024-03-18 NOTE — FLOWSHEET NOTE
Infant has not roomed in the entire shift due to being transferred back to [facility].

## 2024-03-19 NOTE — FLOWSHEET NOTE
Reviewed discharge teaching and AVS, pt verbalizes understanding, denies questions ambulated off unit stable with significant other discharge instructions, medications and belongings.  remains in SCN at this time.

## 2024-05-23 NOTE — PROGRESS NOTES
Infant in SCN, unable to room in at this time.      Patient coming in for 6 month follow up next Friday. Can you place lab orders for patient to complete?

## 2024-10-25 ENCOUNTER — HOSPITAL ENCOUNTER (EMERGENCY)
Age: 19
Discharge: HOME | End: 2024-10-25
Payer: COMMERCIAL

## 2024-10-25 VITALS
RESPIRATION RATE: 15 BRPM | BODY MASS INDEX: 27.46 KG/M2 | OXYGEN SATURATION: 100 % | SYSTOLIC BLOOD PRESSURE: 117 MMHG | DIASTOLIC BLOOD PRESSURE: 67 MMHG | WEIGHT: 160 LBS | HEART RATE: 75 BPM | TEMPERATURE: 98.5 F

## 2024-10-25 DIAGNOSIS — Z98.890 PONV (POSTOPERATIVE NAUSEA AND VOMITING): ICD-10-CM

## 2024-10-25 DIAGNOSIS — Z3A.01 LESS THAN 8 WEEKS GESTATION OF PREGNANCY: ICD-10-CM

## 2024-10-25 DIAGNOSIS — R11.2 PONV (POSTOPERATIVE NAUSEA AND VOMITING): ICD-10-CM

## 2024-10-25 DIAGNOSIS — O21.9 VOMITING OF PREGNANCY, ANTEPARTUM: Primary | ICD-10-CM

## 2024-10-25 LAB
BACTERIA URNS QL MICRO: ABNORMAL /HPF
BILIRUB UR QL STRIP.AUTO: ABNORMAL
CASTS #/AREA URNS LPF: ABNORMAL /LPF
CASTS 2: ABNORMAL /LPF
CHARACTER UR: ABNORMAL
COLOR, UA: ABNORMAL
CRYSTALS URNS MICRO: ABNORMAL
EPITHELIAL CELLS, UA: ABNORMAL /HPF
GLUCOSE UR QL STRIP.AUTO: NEGATIVE MG/DL
HCG UR QL: POSITIVE
HGB UR QL STRIP.AUTO: NEGATIVE
KETONES UR QL STRIP.AUTO: >= 160
MISCELLANEOUS 2: ABNORMAL
MUCOUS THREADS URNS QL MICRO: ABNORMAL
NITRITE UR QL STRIP: NEGATIVE
PH UR STRIP.AUTO: 6 [PH] (ref 5–9)
PROT UR STRIP.AUTO-MCNC: 100 MG/DL
RBC URINE: ABNORMAL /HPF
RENAL EPI CELLS #/AREA URNS HPF: ABNORMAL /[HPF]
SP GR UR REFRACT.AUTO: > 1.03 (ref 1–1.03)
UROBILINOGEN, URINE: 1 EU/DL (ref 0–1)
WBC #/AREA URNS HPF: ABNORMAL /HPF
WBC #/AREA URNS HPF: NEGATIVE /[HPF]
YEAST LIKE FUNGI URNS QL MICRO: ABNORMAL

## 2024-10-25 PROCEDURE — 6360000002 HC RX W HCPCS: Performed by: NURSE PRACTITIONER

## 2024-10-25 PROCEDURE — 99284 EMERGENCY DEPT VISIT MOD MDM: CPT

## 2024-10-25 PROCEDURE — 96372 THER/PROPH/DIAG INJ SC/IM: CPT

## 2024-10-25 PROCEDURE — 6370000000 HC RX 637 (ALT 250 FOR IP): Performed by: NURSE PRACTITIONER

## 2024-10-25 PROCEDURE — 81025 URINE PREGNANCY TEST: CPT

## 2024-10-25 PROCEDURE — 81001 URINALYSIS AUTO W/SCOPE: CPT

## 2024-10-25 RX ORDER — PROCHLORPERAZINE EDISYLATE 5 MG/ML
10 INJECTION INTRAMUSCULAR; INTRAVENOUS ONCE
Status: COMPLETED | OUTPATIENT
Start: 2024-10-25 | End: 2024-10-25

## 2024-10-25 RX ORDER — ONDANSETRON 4 MG/1
4 TABLET, ORALLY DISINTEGRATING ORAL ONCE
Status: COMPLETED | OUTPATIENT
Start: 2024-10-25 | End: 2024-10-25

## 2024-10-25 RX ORDER — METOCLOPRAMIDE 10 MG/1
10 TABLET ORAL 4 TIMES DAILY
Qty: 20 TABLET | Refills: 0 | Status: SHIPPED | OUTPATIENT
Start: 2024-10-25 | End: 2024-10-30

## 2024-10-25 RX ADMIN — PROCHLORPERAZINE EDISYLATE 10 MG: 5 INJECTION INTRAMUSCULAR; INTRAVENOUS at 19:39

## 2024-10-25 RX ADMIN — ONDANSETRON 4 MG: 4 TABLET, ORALLY DISINTEGRATING ORAL at 21:25

## 2024-10-25 ASSESSMENT — PAIN - FUNCTIONAL ASSESSMENT
PAIN_FUNCTIONAL_ASSESSMENT: NONE - DENIES PAIN

## 2024-10-25 NOTE — ED NOTES
Patient to the ED with complaint of emesis. Patient states that she had a tooth extracted three days ago which required sedation. Patient states that historically when she is sedated she experiences nausea and vomiting for a few days following that. She states that she feels dehydrated and requires IV fluids and nausea medications. Patient with moist mucous membranes. VSS. Patient is resting in bed with easy and unlabored respirations. Call light in reach. Side rails up x2. Patient denies further complaints or concerns. Will monitor.

## 2024-10-25 NOTE — ED NOTES
Pt resting on cot w/ eyes open upon entrance. Respirations even and unlabored. Family present at bedside.

## 2024-10-29 NOTE — ED PROVIDER NOTES
Medicine Practice  [address]  [facility] [address]  [phone]  Schedule an appointment as soon as possible for a visit in 2 days  For follow up    Lise Mcnally MD  [address]  [facility] [address] [phone]    Call in 1 day  For follow up      JEANNIE Perkins - Jenelle Garcia APRN - CNP  10/29/24 0658

## 2024-11-18 ENCOUNTER — HOSPITAL ENCOUNTER (EMERGENCY)
Age: 19
Discharge: HOME | End: 2024-11-18
Payer: COMMERCIAL

## 2024-11-18 VITALS
BODY MASS INDEX: 27.31 KG/M2 | WEIGHT: 160 LBS | HEART RATE: 81 BPM | SYSTOLIC BLOOD PRESSURE: 131 MMHG | RESPIRATION RATE: 20 BRPM | OXYGEN SATURATION: 97 % | DIASTOLIC BLOOD PRESSURE: 82 MMHG | HEIGHT: 64 IN | TEMPERATURE: 97.1 F

## 2024-11-18 DIAGNOSIS — R60.0 BILATERAL LOWER EXTREMITY EDEMA: Primary | ICD-10-CM

## 2024-11-18 DIAGNOSIS — R31.29 OTHER MICROSCOPIC HEMATURIA: ICD-10-CM

## 2024-11-18 LAB
BILIRUB UR STRIP.AUTO-MCNC: NEGATIVE MG/DL
CHARACTER UR: CLEAR
COLOR, UA: YELLOW
GLUCOSE UR QL STRIP.AUTO: NEGATIVE MG/DL
KETONES UR QL STRIP.AUTO: NEGATIVE
NITRITE UR QL STRIP.AUTO: NEGATIVE
PH UR STRIP.AUTO: 6.5 [PH] (ref 5–9)
PROT UR STRIP.AUTO-MCNC: NEGATIVE MG/DL
RBC #/AREA URNS HPF: ABNORMAL /[HPF]
SP GR UR STRIP.AUTO: 1.02 (ref 1–1.03)
UROBILINOGEN, URINE: 0.2 EU/DL (ref 0.2–1)
WBC #/AREA URNS HPF: ABNORMAL /[HPF]

## 2024-11-18 PROCEDURE — 81003 URINALYSIS AUTO W/O SCOPE: CPT

## 2024-11-18 PROCEDURE — 99213 OFFICE O/P EST LOW 20 MIN: CPT | Performed by: NURSE PRACTITIONER

## 2024-11-18 PROCEDURE — 99213 OFFICE O/P EST LOW 20 MIN: CPT

## 2024-11-18 RX ORDER — FUROSEMIDE 20 MG/1
20 TABLET ORAL DAILY
Qty: 3 TABLET | Refills: 0 | Status: SHIPPED | OUTPATIENT
Start: 2024-11-18 | End: 2024-11-21

## 2024-11-18 ASSESSMENT — PAIN - FUNCTIONAL ASSESSMENT: PAIN_FUNCTIONAL_ASSESSMENT: NONE - DENIES PAIN

## 2024-11-18 NOTE — ED NOTES
Pt with complaints of bilateral hand swelling and bilateral leg swelling that started 2 days ago. Denies any pain.     Sim Lenz LPN  11/18/24 1855

## 2024-11-22 ASSESSMENT — ENCOUNTER SYMPTOMS
STRIDOR: 0
CHEST TIGHTNESS: 0
SHORTNESS OF BREATH: 0
COUGH: 0
CHOKING: 0
WHEEZING: 0
APNEA: 0

## 2024-11-22 NOTE — ED PROVIDER NOTES
General: Swelling present. Normal range of motion.      Cervical back: Normal range of motion.      Comments: Puffy extremities, bilateral hands and foot, ankle included.  Nonpitting   Skin:     General: Skin is warm.   Neurological:      General: No focal deficit present.      Mental Status: She is alert and oriented to person, place, and time.   Psychiatric:         Mood and Affect: Mood normal.         Behavior: Behavior normal.         Thought Content: Thought content normal.         Judgment: Judgment normal.         DIAGNOSTIC RESULTS   Labs:  Results for orders placed or performed during the hospital encounter of 11/18/24   Urinalysis   Result Value Ref Range    Glucose, Ur Negative NEGATIVE mg/dl    Bilirubin, Urine Negative NEGATIVE    Ketones, Urine Negative NEGATIVE    Specific Gravity, UA 1.020 1.002 - 1.030    Blood, Urine Moderate (A) NEGATIVE    pH, Urine 6.50 5.0 - 9.0    Protein, UA Negative NEGATIVE mg/dl    Urobilinogen, Urine 0.20 0.2 - 1.0 eu/dl    Nitrite, Urine Negative NEGATIVE    Leukocyte Esterase, Urine Trace (A) NEGATIVE    Color, UA Yellow STRAW-YELLOW    Character, Urine Clear CLEAR-SL CLOUD       IMAGING:  No orders to display     URGENT CARE COURSE:     Vitals:    11/18/24 1853   BP: 131/82   Pulse: 81   Resp: 20   Temp: 97.1 °F (36.2 °C)   TempSrc: Temporal   SpO2: 97%   Weight: 72.6 kg (160 lb)   Height: 1.626 m (5' 4\")       Medications - No data to display  PROCEDURES:  None  FINAL IMPRESSION      1. Bilateral lower extremity edema    2. Other microscopic hematuria        DISPOSITION/PLAN   Decision To Discharge    The patient has been evaluated and the history and physical exam suggest a benign etiology.  I see nothing to suggest coronary ischemia, myocardial infarction, pulmonary embolism, thoracic aortic dissection, significant pericarditis, pneumonia, pneumothorax, or acute abdomen.  I feel the patient can be safely discharged to home with outpatient follow up.  Instructions

## 2025-04-22 ENCOUNTER — HOSPITAL ENCOUNTER (INPATIENT)
Age: 20
LOS: 2 days | Discharge: HOME | DRG: 751 | End: 2025-04-25
Attending: PSYCHIATRY & NEUROLOGY | Admitting: PSYCHIATRY & NEUROLOGY
Payer: COMMERCIAL

## 2025-04-22 DIAGNOSIS — F30.9 MANIA (HCC): ICD-10-CM

## 2025-04-22 DIAGNOSIS — R45.1 AGITATION: Primary | ICD-10-CM

## 2025-04-22 LAB
ALBUMIN SERPL BCG-MCNC: 4.1 G/DL (ref 3.4–4.9)
ALP SERPL-CCNC: 90 U/L (ref 38–126)
ALT SERPL W/O P-5'-P-CCNC: 12 U/L (ref 10–35)
AMPHETAMINES UR QL SCN: NEGATIVE
ANION GAP SERPL CALC-SCNC: 18 MEQ/L (ref 8–16)
APAP SERPL-MCNC: < 5 UG/ML (ref 10–30)
AST SERPL-CCNC: 23 U/L (ref 10–35)
B-HCG SERPL QL: NEGATIVE
BACTERIA URNS QL MICRO: ABNORMAL /HPF
BARBITURATES UR QL SCN: NEGATIVE
BASOPHILS ABSOLUTE: 0 THOU/MM3 (ref 0–0.1)
BASOPHILS NFR BLD AUTO: 0.5 %
BENZODIAZ UR QL SCN: NEGATIVE
BILIRUB CONJ SERPL-MCNC: 0.3 MG/DL (ref 0–0.2)
BILIRUB SERPL-MCNC: 0.9 MG/DL (ref 0.3–1.2)
BILIRUB UR QL STRIP.AUTO: NEGATIVE
BUN SERPL-MCNC: 10 MG/DL (ref 8–23)
BZE UR QL SCN: NEGATIVE
CALCIUM SERPL-MCNC: 9.3 MG/DL (ref 8.6–10)
CANNABINOIDS UR QL SCN: POSITIVE
CASTS #/AREA URNS LPF: ABNORMAL /LPF
CASTS 2: ABNORMAL /LPF
CHARACTER UR: CLEAR
CHLORIDE SERPL-SCNC: 108 MEQ/L (ref 98–111)
CO2 SERPL-SCNC: 13 MEQ/L (ref 22–29)
COLOR, UA: YELLOW
CREAT SERPL-MCNC: 0.8 MG/DL (ref 0.5–0.9)
CRYSTALS URNS MICRO: ABNORMAL
DEPRECATED RDW RBC AUTO: 55.4 FL (ref 35–45)
EOSINOPHIL NFR BLD AUTO: 1.7 %
EOSINOPHILS ABSOLUTE: 0.1 THOU/MM3 (ref 0–0.4)
EPITHELIAL CELLS, UA: ABNORMAL /HPF
ERYTHROCYTE [DISTWIDTH] IN BLOOD BY AUTOMATED COUNT: 17.3 % (ref 11.5–14.5)
ETHANOL SERPL-MCNC: < 0.01 % (ref 0–0.08)
FENTANYL: NEGATIVE
GFR SERPL CREATININE-BSD FRML MDRD: > 90 ML/MIN/1.73M2
GLUCOSE SERPL-MCNC: 72 MG/DL (ref 74–109)
GLUCOSE UR QL STRIP.AUTO: NEGATIVE MG/DL
HCT VFR BLD AUTO: 36.5 % (ref 37–47)
HGB BLD-MCNC: 11.3 GM/DL (ref 12–16)
HGB UR QL STRIP.AUTO: NEGATIVE
IMM GRANULOCYTES # BLD AUTO: 0.01 THOU/MM3 (ref 0–0.07)
IMM GRANULOCYTES NFR BLD AUTO: 0.2 %
KETONES UR QL STRIP.AUTO: 15
LYMPHOCYTES ABSOLUTE: 2.2 THOU/MM3 (ref 1–4.8)
LYMPHOCYTES NFR BLD AUTO: 38 %
MCH RBC QN AUTO: 27.4 PG (ref 26–33)
MCHC RBC AUTO-ENTMCNC: 31 GM/DL (ref 32.2–35.5)
MCV RBC AUTO: 88.6 FL (ref 81–99)
MISCELLANEOUS 2: ABNORMAL
MONOCYTES ABSOLUTE: 0.5 THOU/MM3 (ref 0.4–1.3)
MONOCYTES NFR BLD AUTO: 8.1 %
NEUTROPHILS ABSOLUTE: 3 THOU/MM3 (ref 1.8–7.7)
NEUTROPHILS NFR BLD AUTO: 51.5 %
NITRITE UR QL STRIP: NEGATIVE
NRBC BLD AUTO-RTO: 0 /100 WBC
OPIATES UR QL SCN: NEGATIVE
OSMOLALITY SERPL CALC.SUM OF ELEC: 275.1 MOSMOL/KG (ref 275–300)
OXYCODONE: NEGATIVE
PCP UR QL SCN: NEGATIVE
PH UR STRIP.AUTO: 6.5 [PH] (ref 5–9)
PLATELET # BLD AUTO: 327 THOU/MM3 (ref 130–400)
PLATELET BLD QL SMEAR: ADEQUATE
PMV BLD AUTO: 10.9 FL (ref 9.4–12.4)
POTASSIUM SERPL-SCNC: 3.8 MEQ/L (ref 3.5–5.2)
PROT SERPL-MCNC: 7.3 G/DL (ref 6.4–8.3)
PROT UR STRIP.AUTO-MCNC: 30 MG/DL
RBC # BLD AUTO: 4.12 MILL/MM3 (ref 4.2–5.4)
RBC URINE: ABNORMAL /HPF
RENAL EPI CELLS #/AREA URNS HPF: ABNORMAL /[HPF]
SALICYLATES SERPL-MCNC: < 0.5 MG/DL (ref 2–10)
SCAN OF BLOOD SMEAR: NORMAL
SODIUM SERPL-SCNC: 139 MEQ/L (ref 135–145)
SP GR UR REFRACT.AUTO: > 1.03 (ref 1–1.03)
TSH SERPL DL<=0.05 MIU/L-ACNC: 1.1 UIU/ML (ref 0.27–4.2)
UROBILINOGEN, URINE: 1 EU/DL (ref 0–1)
WBC # BLD AUTO: 5.9 THOU/MM3 (ref 4.8–10.8)
WBC #/AREA URNS HPF: ABNORMAL /HPF
WBC #/AREA URNS HPF: NEGATIVE /[HPF]
YEAST LIKE FUNGI URNS QL MICRO: ABNORMAL

## 2025-04-22 PROCEDURE — 85025 COMPLETE CBC W/AUTO DIFF WBC: CPT

## 2025-04-22 PROCEDURE — 80143 DRUG ASSAY ACETAMINOPHEN: CPT

## 2025-04-22 PROCEDURE — 96372 THER/PROPH/DIAG INJ SC/IM: CPT

## 2025-04-22 PROCEDURE — 36415 COLL VENOUS BLD VENIPUNCTURE: CPT

## 2025-04-22 PROCEDURE — 99285 EMERGENCY DEPT VISIT HI MDM: CPT

## 2025-04-22 PROCEDURE — 82248 BILIRUBIN DIRECT: CPT

## 2025-04-22 PROCEDURE — 84703 CHORIONIC GONADOTROPIN ASSAY: CPT

## 2025-04-22 PROCEDURE — 80179 DRUG ASSAY SALICYLATE: CPT

## 2025-04-22 PROCEDURE — 80307 DRUG TEST PRSMV CHEM ANLYZR: CPT

## 2025-04-22 PROCEDURE — 82077 ASSAY SPEC XCP UR&BREATH IA: CPT

## 2025-04-22 PROCEDURE — 6360000002 HC RX W HCPCS: Performed by: PHYSICIAN ASSISTANT

## 2025-04-22 PROCEDURE — 81003 URINALYSIS AUTO W/O SCOPE: CPT

## 2025-04-22 PROCEDURE — 80053 COMPREHEN METABOLIC PANEL: CPT

## 2025-04-22 PROCEDURE — 84443 ASSAY THYROID STIM HORMONE: CPT

## 2025-04-22 PROCEDURE — 81001 URINALYSIS AUTO W/SCOPE: CPT

## 2025-04-22 RX ORDER — HALOPERIDOL 5 MG/ML
5 INJECTION INTRAMUSCULAR ONCE
Status: COMPLETED | OUTPATIENT
Start: 2025-04-22 | End: 2025-04-22

## 2025-04-22 RX ORDER — DIPHENHYDRAMINE HYDROCHLORIDE 50 MG/ML
50 INJECTION, SOLUTION INTRAMUSCULAR; INTRAVENOUS ONCE
Status: COMPLETED | OUTPATIENT
Start: 2025-04-22 | End: 2025-04-22

## 2025-04-22 RX ORDER — DIAZEPAM 10 MG/2ML
5 INJECTION, SOLUTION INTRAMUSCULAR; INTRAVENOUS ONCE
Status: COMPLETED | OUTPATIENT
Start: 2025-04-22 | End: 2025-04-22

## 2025-04-22 RX ADMIN — DIPHENHYDRAMINE HYDROCHLORIDE 50 MG: 50 INJECTION, SOLUTION INTRAMUSCULAR; INTRAVENOUS at 21:59

## 2025-04-22 RX ADMIN — DIAZEPAM 5 MG: 5 INJECTION, SOLUTION INTRAMUSCULAR; INTRAVENOUS at 22:15

## 2025-04-22 RX ADMIN — HALOPERIDOL LACTATE 5 MG: 5 INJECTION, SOLUTION INTRAMUSCULAR at 21:59

## 2025-04-22 ASSESSMENT — PATIENT HEALTH QUESTIONNAIRE - PHQ9
2. FEELING DOWN, DEPRESSED OR HOPELESS: NOT AT ALL
1. LITTLE INTEREST OR PLEASURE IN DOING THINGS: NOT AT ALL
SUM OF ALL RESPONSES TO PHQ QUESTIONS 1-9: 0

## 2025-04-22 ASSESSMENT — LIFESTYLE VARIABLES
HOW MANY STANDARD DRINKS CONTAINING ALCOHOL DO YOU HAVE ON A TYPICAL DAY: PATIENT DOES NOT DRINK
HOW OFTEN DO YOU HAVE A DRINK CONTAINING ALCOHOL: NEVER

## 2025-04-22 ASSESSMENT — SLEEP AND FATIGUE QUESTIONNAIRES
DO YOU HAVE DIFFICULTY SLEEPING: NO
DO YOU USE A SLEEP AID: NO

## 2025-04-22 ASSESSMENT — PAIN - FUNCTIONAL ASSESSMENT: PAIN_FUNCTIONAL_ASSESSMENT: NONE - DENIES PAIN

## 2025-04-23 PROBLEM — F23 ACUTE PSYCHOSIS (HCC): Status: ACTIVE | Noted: 2025-04-23

## 2025-04-23 PROCEDURE — APPSS30 APP SPLIT SHARED TIME 16-30 MINUTES: Performed by: PHYSICIAN ASSISTANT

## 2025-04-23 PROCEDURE — 6370000000 HC RX 637 (ALT 250 FOR IP): Performed by: PHYSICIAN ASSISTANT

## 2025-04-23 PROCEDURE — 99222 1ST HOSP IP/OBS MODERATE 55: CPT | Performed by: PSYCHIATRY & NEUROLOGY

## 2025-04-23 PROCEDURE — 6370000000 HC RX 637 (ALT 250 FOR IP): Performed by: PSYCHIATRY & NEUROLOGY

## 2025-04-23 PROCEDURE — 1240000000 HC EMOTIONAL WELLNESS R&B

## 2025-04-23 RX ORDER — ARIPIPRAZOLE 10 MG/1
10 TABLET ORAL DAILY
Status: DISCONTINUED | OUTPATIENT
Start: 2025-04-23 | End: 2025-04-25 | Stop reason: HOSPADM

## 2025-04-23 RX ORDER — ACETAMINOPHEN 325 MG/1
650 TABLET ORAL EVERY 6 HOURS PRN
Status: DISCONTINUED | OUTPATIENT
Start: 2025-04-23 | End: 2025-04-25 | Stop reason: HOSPADM

## 2025-04-23 RX ORDER — TRAZODONE HYDROCHLORIDE 50 MG/1
50 TABLET ORAL NIGHTLY PRN
Status: DISCONTINUED | OUTPATIENT
Start: 2025-04-23 | End: 2025-04-25 | Stop reason: HOSPADM

## 2025-04-23 RX ORDER — HYDROXYZINE HYDROCHLORIDE 25 MG/1
50 TABLET, FILM COATED ORAL 3 TIMES DAILY PRN
Status: DISCONTINUED | OUTPATIENT
Start: 2025-04-23 | End: 2025-04-25 | Stop reason: HOSPADM

## 2025-04-23 RX ORDER — DIPHENHYDRAMINE HYDROCHLORIDE 50 MG/ML
50 INJECTION, SOLUTION INTRAMUSCULAR; INTRAVENOUS EVERY 4 HOURS PRN
Status: DISCONTINUED | OUTPATIENT
Start: 2025-04-23 | End: 2025-04-25 | Stop reason: HOSPADM

## 2025-04-23 RX ORDER — MAGNESIUM HYDROXIDE/ALUMINUM HYDROXICE/SIMETHICONE 120; 1200; 1200 MG/30ML; MG/30ML; MG/30ML
30 SUSPENSION ORAL EVERY 6 HOURS PRN
Status: DISCONTINUED | OUTPATIENT
Start: 2025-04-23 | End: 2025-04-25 | Stop reason: HOSPADM

## 2025-04-23 RX ORDER — IBUPROFEN 400 MG/1
400 TABLET, FILM COATED ORAL EVERY 6 HOURS PRN
Status: DISCONTINUED | OUTPATIENT
Start: 2025-04-23 | End: 2025-04-25 | Stop reason: HOSPADM

## 2025-04-23 RX ORDER — POLYETHYLENE GLYCOL 3350 17 G/17G
17 POWDER, FOR SOLUTION ORAL DAILY PRN
Status: DISCONTINUED | OUTPATIENT
Start: 2025-04-23 | End: 2025-04-25 | Stop reason: HOSPADM

## 2025-04-23 RX ADMIN — TRAZODONE HYDROCHLORIDE 50 MG: 50 TABLET ORAL at 21:05

## 2025-04-23 RX ADMIN — ARIPIPRAZOLE 10 MG: 10 TABLET ORAL at 17:06

## 2025-04-23 RX ADMIN — HYDROXYZINE HYDROCHLORIDE 50 MG: 25 TABLET ORAL at 21:05

## 2025-04-23 ASSESSMENT — PATIENT HEALTH QUESTIONNAIRE - PHQ9
2. FEELING DOWN, DEPRESSED OR HOPELESS: NOT AT ALL
SUM OF ALL RESPONSES TO PHQ QUESTIONS 1-9: 0
1. LITTLE INTEREST OR PLEASURE IN DOING THINGS: NOT AT ALL
SUM OF ALL RESPONSES TO PHQ QUESTIONS 1-9: 0

## 2025-04-23 ASSESSMENT — SLEEP AND FATIGUE QUESTIONNAIRES
DO YOU HAVE DIFFICULTY SLEEPING: NO
AVERAGE NUMBER OF SLEEP HOURS: 9
DO YOU USE A SLEEP AID: NO

## 2025-04-23 ASSESSMENT — PAIN SCALES - GENERAL: PAINLEVEL_OUTOF10: 0

## 2025-04-23 NOTE — PLAN OF CARE
Problem: Risk for Elopement  Goal: Patient will not exit the unit/facility without proper excort  Outcome: Progressing  Flowsheets  Taken 4/23/2025 0946 by Donta Arzola RN  Nursing Interventions for Elopement Risk:   Make sure patient has all necessary personal care items   Reduce environmental triggers  Taken 4/23/2025 0522 by Ermias Bermudez RN  Nursing Interventions for Elopement Risk:   Assist with personal care needs such as toileting, eating, dressing, as needed to reduce the risk of wandering   Collaborate with family members/caregivers to mitigate the elopement risk   Collaborate with treatment team for drug withdrawal symptoms treatment   Communicate/escalate to nursing supervisor the risk of elopement   Reduce environmental triggers   Place patient in room far away from exits and stairways   Make sure patient has all necessary personal care items   Communicate/escalate to /other team member the risk of elopement   Communicate/escalate to charge nurse the risk of elopement   Escort with two staff members if patient must leave the unit   Communicate to physician the risk for elopement   Collaborate with treatment team for nicotine replacement  Taken 4/23/2025 0514 by Ermias Bermudez RN  Nursing Interventions for Elopement Risk:   Assist with personal care needs such as toileting, eating, dressing, as needed to reduce the risk of wandering   Collaborate with family members/caregivers to mitigate the elopement risk   Collaborate with treatment team for drug withdrawal symptoms treatment   Communicate/escalate to nursing supervisor the risk of elopement   Reduce environmental triggers   Place patient in room far away from exits and stairways   Make sure patient has all necessary personal care items   Communicate/escalate to /other team member the risk of elopement   Communicate/escalate to charge nurse the risk of elopement   Escort with two staff members if patient

## 2025-04-23 NOTE — ED NOTES
Patient resting in bed with eyes closed, breathing even and unlabored. Constant observer remains in place.
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
## 2025-04-23 NOTE — ED NOTES
Patient transferred back to room 26 with [place] police at this time. Father and father's girlfriend present.

## 2025-04-23 NOTE — ED NOTES
Urine sample collected and sent to lab. Pt sitting on bed with family at bedside, campus police monitoring.

## 2025-04-23 NOTE — ED TRIAGE NOTES
Patient to ED via intake with father due to concerns of erratic behavior. Father states patient has been acting crazy. Fathers girlfriend states patient has been not sleeping for almost a week, having periods of confusion. Father girlfriend states this happened a few years ago where patient was seen at [facility]. Parent is concerned pt is a danger to herself and others. Jenelle and charge nurse called to bedside.

## 2025-04-23 NOTE — ED NOTES
Patient being calm and cooperative at this time. Vitals obtained. Patient refusing to change into scrubs at this time. Patient denies any suicidal or homicidal ideation, patient states \"I feel safe at home with a gun\". Constant observer remains in place, level A paged.

## 2025-04-23 NOTE — ED NOTES
This RN called back to room by [place] police. [place] police states patient continues to be agitated and threatening to \"throw punches\". On arrival to room patient calm and requesting to speak with her mother, patient agreed to take medications ordered by provider. Patient updated on POC. Patient requested her BP be taken and ice water. Patient now resting on mothers lap.
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
## 2025-04-23 NOTE — ED NOTES
Patient agreed to be cooperative with staff and ambulated to safe rooms with this RN. EMC placed per Dr. Parson and ALEE Almanzar. Pt informed of EMC. Updated family on POC. Patient refused to changed into clothing at this time, family at bedside.
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
## 2025-04-23 NOTE — PATIENT CARE CONFERENCE
Behavioral Health  Initial Interdisciplinary Treatment Plan NOTE    REVIEW DATE AND TIME: *** ***    PATIENT {WAS/WAS NOT:[id number]} IN TREATMENT TEAM.  See Multidisciplinary Treatment Team sheet for participants.    ADMISSION TYPE:   Admission Type: Involuntary    REASON FOR ADMISSION:         Estimated Length of Stay Update:  ***  Estimated Discharge Date Update: ***    Patient Strengths/Barriers  Strengths (Must Choose Two): Independent living, Stable domestic situation, Support from family, Stable housing, Support from organized community, Technical/vocation  Barriers: Other (comment) (denies any deficiency)  Addictive Behavior:Addictive Behavior  In the Past 3 Months, Have You Felt or Has Someone Told You That You Have a Problem With  : None  Medical Problems:  Past Medical History:   Diagnosis Date    Abdominal pain     Fall     1/13/2024       EDUCATION:   Learner Progress Toward Treatment Goals: {[facility] Education Learner Progress:594752539}    Method: {[facility] Education Learner Method:567126923}    Outcome: {[facility] Education Learner Outcome:566414559}    PATIENT GOALS: ***    OQ PRIORITIES IDENTIFIED BY THE PATIENT ON ADMISSION: (These are the symptoms that the patient has identified that are impacting them the most at the time of admission based on their own input on the OQ.  These priorities are to be included in all of their care while admitted.)        ***    PLAN/TREATMENT RECOMMENDATIONS UPDATE:   What is the most important thing we can help you with while you are here?  Who is your support system?  Do you have follow-up providers?  Do you have the ability to pay for your medications?  Where will you be residing when you leave the hospital?  Will need a return to work slip or FMLA paper completion?      GOALS UPDATE:   Time frame for Short-Term Goals: ***    NINFA Estrada

## 2025-04-24 PROCEDURE — 1240000000 HC EMOTIONAL WELLNESS R&B

## 2025-04-24 PROCEDURE — APPSS30 APP SPLIT SHARED TIME 16-30 MINUTES: Performed by: PHYSICIAN ASSISTANT

## 2025-04-24 PROCEDURE — 6370000000 HC RX 637 (ALT 250 FOR IP): Performed by: PSYCHIATRY & NEUROLOGY

## 2025-04-24 PROCEDURE — 6370000000 HC RX 637 (ALT 250 FOR IP): Performed by: PHYSICIAN ASSISTANT

## 2025-04-24 RX ADMIN — ARIPIPRAZOLE 10 MG: 10 TABLET ORAL at 08:27

## 2025-04-24 RX ADMIN — TRAZODONE HYDROCHLORIDE 50 MG: 50 TABLET ORAL at 20:52

## 2025-04-24 RX ADMIN — IBUPROFEN 400 MG: 400 TABLET, FILM COATED ORAL at 09:25

## 2025-04-24 RX ADMIN — HYDROXYZINE HYDROCHLORIDE 50 MG: 25 TABLET ORAL at 20:52

## 2025-04-24 RX ADMIN — IBUPROFEN 400 MG: 400 TABLET, FILM COATED ORAL at 17:02

## 2025-04-24 ASSESSMENT — PAIN - FUNCTIONAL ASSESSMENT
PAIN_FUNCTIONAL_ASSESSMENT: ACTIVITIES ARE NOT PREVENTED
PAIN_FUNCTIONAL_ASSESSMENT: ACTIVITIES ARE NOT PREVENTED

## 2025-04-24 ASSESSMENT — PAIN SCALES - GENERAL
PAINLEVEL_OUTOF10: 5
PAINLEVEL_OUTOF10: 0
PAINLEVEL_OUTOF10: 3

## 2025-04-24 ASSESSMENT — PAIN DESCRIPTION - ORIENTATION: ORIENTATION: RIGHT

## 2025-04-24 ASSESSMENT — PAIN DESCRIPTION - LOCATION
LOCATION: ANKLE
LOCATION: LEG

## 2025-04-24 ASSESSMENT — PAIN DESCRIPTION - DESCRIPTORS
DESCRIPTORS: ACHING
DESCRIPTORS: CRAMPING

## 2025-04-24 NOTE — BH NOTE
Assessment/Recommendations   Assessment:    1.  Heart failure with preserved ejection fraction, NYHA class II: Compensated.  She denies any acute heart failure symptoms.  Her dyspnea on exertion has improved.  Her lower extremity edema has resolved.  Her weight has decreased at TCU.  She is watching her sodium intake.  2.  Hypertension: Controlled.  Blood pressure 138/70  3.  Obesity: BMI 42.69.  She has lost weight since discharge    Plan:  1.  Continue current medications  2.  Continue low-salt diet and monitoring weights  3.  Continue regular activity    Makayla Dow will follow up with Dr. Rios in 2 months.     History of Present Illness/Subjective    Ms. Makayla Dow is a 83 year old female seen at [facility] heart failure clinic today for continued follow-up.  She follows up for heart failure with preserved ejection fraction.  She was hospitalized September 19 to September 29 with acute respiratory failure.  She also had a fever and was found to be septic due to MSSA bacteremia.  She was started on IV Lasix and symptoms improved.  She was started on IV antibiotics and will need 6 weeks of treatment.  She had an echocardiogram which showed ejection fraction of 60%.  She also had a Lexiscan due to elevated troponin which was negative for ischemia and low risk of future cardiac events.  She was discharged to TCU.  Past medical history significant for hypertension, hyperlipidemia, anemia, hypothyroidism, obesity.    Today, she states her dyspnea on exertion has improved.  Her lower extremity edema has resolved.  She denies lightheadedness, shortness of breath, orthopnea, PND, palpitations, chest pain, abdominal fullness/bloating, and lower extremity edema.      Her TCU is monitoring her weights and her weight was 232 pounds.  She is following a low sodium diet.  She participates in regular physical activity including rehab.  She normally lives with her  in her own  Pt declined to attend [address] group therapy session offered today. Pt was provided maximum verbal encouragement to attend group therapy session. Pt remained resting in bed.    "home.      ECHOCARDIOGRAM: 9/20/2023  Interpretation Summary     1. Normal left ventricular size and systolic performance with a visually  estimated ejection fraction of 60%.  2. No significant valvular heart disease is identified on this study.  3. Normal right ventricular size and systolic performance.  4. There is mild biatrial enlargement.    Lexiscan: 9/22/2023  Result Text        A prior study was conducted on 8/27/2020.  This study has no change when compared with the prior study.     Pharmacological regadenoson stress ECG is negative for inducible myocardial ischemia.     Pharmacological Regadenonson nuclear stress test is probably abnormal.  There is mildly reduced radiotracer uptake in inferolateral segments from basal to mid segments which is consistent with previous nontransmural myocardial infarction.  There is no reversible change on stress and rest images indicating inducible myocardial ischemia.     Normal left ventricular size, wall motion and systolic function.  The calculated left ventricular ejection fraction is 70%.     The patient is at a low risk of future cardiac ischemic events.     Physical Examination Review of Systems   /70 (BP Location: Left arm, Patient Position: Sitting, Cuff Size: Adult Large)   Pulse 70   Resp 16   Ht 1.575 m (5' 2\")   Wt 105.9 kg (233 lb 6.4 oz)   SpO2 96%   BMI 42.69 kg/m    Body mass index is 42.69 kg/m .  Wt Readings from Last 3 Encounters:   10/05/23 105.9 kg (233 lb 6.4 oz)   09/30/23 117.2 kg (258 lb 6.4 oz)   09/27/23 117.2 kg (258 lb 6.1 oz)       General Appearance:   no acute distress   ENT/Mouth: No abnormalities   EYES:  no scleral icterus, normal conjunctivae   Neck: no thyromegaly   Chest/Lungs:   lungs are clear to auscultation, no rales or wheezing, equal chest wall expansion    Cardiovascular:   Regular. Normal first and second heart sounds with no murmurs, rubs, or gallops, no edema bilaterally    Abdomen:  Obese, bowel sounds are " "present   Extremities: no cyanosis or clubbing   Skin: warm   Neurologic: no tremors     Psychiatric: alert and oriented x3    Enc Vitals  BP: 138/70  Pulse: 70  Resp: 16  SpO2: 96 %  Weight: 105.9 kg (233 lb 6.4 oz)  Height: 157.5 cm (5' 2\")                                         Medical History  Surgical History Family History Social History   Past Medical History:   Diagnosis Date     Chronic heart failure with preserved ejection fraction (H) 10/05/2023     Congestive heart failure (H)      Hyperlipidemia      Hypertension      Obese      Primary hypertension 10/05/2023    Past Surgical History:   Procedure Laterality Date     IR BONE BIOPSY VERTEBRAL  9/27/2023     PICC SINGLE LUMEN PLACEMENT  9/28/2023    No family history on file. Social History     Socioeconomic History     Marital status:      Spouse name: Not on file     Number of children: Not on file     Years of education: Not on file     Highest education level: Not on file   Occupational History     Not on file   Tobacco Use     Smoking status: Never     Smokeless tobacco: Not on file   Substance and Sexual Activity     Alcohol use: Not Currently     Drug use: Not on file     Sexual activity: Not on file   Other Topics Concern     Not on file   Social History Narrative     Not on file     Social Determinants of Health     Financial Resource Strain: Not on file   Food Insecurity: Not on file   Transportation Needs: Not on file   Physical Activity: Not on file   Stress: Not on file   Social Connections: Not on file   Interpersonal Safety: Not on file   Housing Stability: Not on file          Medications  Allergies   Current Outpatient Medications   Medication Sig Dispense Refill     aspirin (ASPIRIN LOW DOSE) 81 MG EC tablet [ASPIRIN (ASPIRIN LOW DOSE) 81 MG EC TABLET] Take 81 mg by mouth daily.       bumetanide (BUMEX) 2 MG tablet Take 1 tablet (2 mg) by mouth daily 30 tablet 1     ceFAZolin (ANCEF) intermittent infusion 2 g in 100 mL dextrose " PRE-MIX Inject 100 mLs (2 g) into the vein every 8 hours for 42 days 13905 mL 0     levothyroxine (SYNTHROID, LEVOTHROID) 50 MCG tablet [LEVOTHYROXINE (SYNTHROID, LEVOTHROID) 50 MCG TABLET] Take 50 mcg by mouth daily.       losartan (COZAAR) 25 MG tablet Take 1 tablet (25 mg) by mouth daily 30 tablet 1     lovastatin (MEVACOR) 20 MG tablet [LOVASTATIN (MEVACOR) 20 MG TABLET] Take 20 mg by mouth at bedtime.       methocarbamol (ROBAXIN) 500 MG tablet Take 1 tablet (500 mg) by mouth 3 times daily as needed for muscle spasms 30 tablet 1     multivitamin therapeutic tablet [MULTIVITAMIN THERAPEUTIC TABLET] Take 1 tablet by mouth daily.       omega 3-dha-epa-fish oil (FISH OIL) 1,000 mg (120 mg-180 mg) cap [OMEGA 3-DHA-EPA-FISH OIL (FISH OIL) 1,000 MG (120 MG-180 MG) CAP] Take by mouth.       oxyBUTYnin ER (DITROPAN XL) 10 MG 24 hr tablet Take 10 mg by mouth daily       spironolactone (ALDACTONE) 25 MG tablet Take 0.5 tablets (12.5 mg) by mouth daily 15 tablet 1    Allergies   Allergen Reactions     Hydroxychloroquine Itching     Ampicillin Rash     Naproxen Itching         Lab Results    Chemistry/lipid CBC Cardiac Enzymes/BNP/TSH/INR   Lab Results   Component Value Date    CHOL 155 10/03/2023    HDL 45 (L) 10/03/2023    TRIG 122 10/03/2023    BUN 22.7 10/03/2023     10/03/2023    CO2 30 (H) 10/03/2023    Lab Results   Component Value Date    WBC 5.6 10/03/2023    HGB 10.5 (L) 10/03/2023    HCT 33.9 (L) 10/03/2023    MCV 97 10/03/2023     10/03/2023    Lab Results   Component Value Date    TROPONINI <0.01 08/27/2020     02/10/2022    TSH 2.98 09/20/2023    INR 1.14 09/26/2023             This note has been dictated using voice recognition software. Any grammatical, typographical, or context distortions are unintentional and inherent to the software    30 minutes spent on the date of encounter doing chart review, review of outside records, review of test results, interpretation with above tests,  patient visit, and documentation.

## 2025-04-24 NOTE — BH NOTE
INPATIENT RECREATIONAL THERAPY  ADULT BEHAVIORAL SERVICES  EVALUATION    REFERRING PHYSICIAN:  Crystal Gonzalez MD  DIAGNOSIS:     PRECAUTIONS:  standard   HISTORY OF PRESENT ILLNESS/INJURY:   See physician H&P  PMH:  Please see medical chart for prior medical history, allergies, and medicatio    HISTORY OF PSYCHIATRIC TREATMENT:  See physician H&p  DATE OF BIRTH:    GENDER: female  MARITAL STATUS:  Single   EMPLOYMENT STATUS:  unemployed   LIVING SITUATION/SUPPORT:  Lives in [place], [place]  EDUCATIONAL LEVEL: Graduated from    MEDICATION/DRUG USE:  See physician H&P  LEISURE INTERESTS:  spending time with friends and family, working on clothing designs/listening to music  ACTIVITY PREFERENCE: no preference   ACTIVITY TYPES:  indoor/outdoor/active/passive  COGNITION: A&xO4    COPING: fair  ATTENTION: fair  RELAXATION: poor  SELF-ESTEEM: fair  MOTIVATION:  poor    SOCIAL SKILLS:  fair  FRUSTRATION TOLERANCE:  no documented hx of violent or aggressive behavior   ATTENTION SEEKING: no attention seeking behavior observed at this time  COOPERATION: pt. Was pleasant and agreeable to the TR assessment   AFFECT: congruent   APPEARANCE: pt. Displays fair grooming and hygiene and wears personal attire.     HEARING:  WNL  VISION: WNL    VERBAL COMMUNICATION:  no impairment noted at this time  WRITTEN COMMUNICATION:  no impairment noted at this time    COORDINATION: no impairment noted at this time   MOBILITY: pt. Is able to ambulate interdependently on the unit    GOALS: .to increase socialization and knowledge of coping skills through participation in group therapy sessions following verbal encouragement from staff.

## 2025-04-24 NOTE — PLAN OF CARE
Problem: Decision Making  Goal: Pt/Family able to effectively weigh alternatives and participate in decision making related to treatment and care  Description: INTERVENTIONS:1. Determine when there are differences between patient's view, family's view, and healthcare provider's view of condition2. Facilitate patient and family articulation of goals for care3. Help patient and family identify pros/cons of alternative solutions4. Provide information as requested by patient/family5. Respect patient/family right to receive or not to receive information6. Serve as a liaison between patient and family and health care team7. Initiate Consults from Ethics, Palliative Care or initiate Family Care Conference as is appropriate  4/23/2025 2204 by Luci Khalil, RN  Outcome: Not Progressing  Flowsheets (Taken 4/23/2025 2204)  Patient/family able to effectively weigh alternatives and participate in decision making related to treatment and care: Facilitate patient and family articulation of goals for care     Problem: Risk for Elopement  Goal: Patient will not exit the unit/facility without proper excort  4/23/2025 2204 by Luci Khalil, RN  Outcome: Progressing  Flowsheets (Taken 4/23/2025 2204)  Nursing Interventions for Elopement Risk:   Reduce environmental triggers   Make sure patient has all necessary personal care items  Note: No verbalizations or attempts to leave the unit.        Problem: Anxiety  Goal: Will report anxiety at manageable levels  Description: INTERVENTIONS:1. Administer medication as ordered2. Teach and rehearse alternative coping skills3. Provide emotional support with 1:1 interaction with staff  4/23/2025 2204 by Luci Khalil, RN  Outcome: Progressing  Flowsheets (Taken 4/23/2025 2204)  Will report anxiety at manageable levels: Administer medication as ordered  Note: Took atarax for anxiety      Problem: Behavior  Goal: Pt/Family maintain appropriate behavior and adhere to behavioral management

## 2025-04-24 NOTE — PLAN OF CARE
Problem: Coping  Goal: Pt/Family able to verbalize concerns and demonstrate effective coping strategies  Description: INTERVENTIONS:1. Assist patient/family to identify coping skills, available support systems and cultural and spiritual values2. Provide emotional support, including active listening and acknowledgement of concerns of patient and caregivers3. Reduce environmental stimuli, as able4. Instruct patient/family in relaxation techniques, as appropriate5. Assess for spiritual pain/suffering and initiate Spiritual Care, Psychosocial Clinical Specialist consults as needed  4/24/2025 1606 by Sabrina Mccurdy CTRS  Outcome: Not Progressing  Flowsheets (Taken 4/24/2025 1606)  Patient/family able to verbalize anxieties, fears, and concerns, and demonstrate effective coping:   Assist patient/family to identify coping skills, available support systems and cultural and spiritual values   Provide emotional support, including active listening and acknowledgement of concerns of patient and caregivers  Note: Pt has attended 0/4 group therapy sessions offered thus far today. Pt has been given maximum verbal encouragement to attend group therapy sessions, pt has been isolative to his room the majority of the day. Intermittently, pt is seen out on the unit interacting with others. Plan to continue to monitor progress and encourage participation in group therapy programming.     4/24/2025 1103 by Donta Arzola, RN  Outcome: Progressing  Flowsheets (Taken 4/24/2025 1103)  Patient/family able to verbalize anxieties, fears, and concerns, and demonstrate effective coping:   Provide emotional support, including active listening and acknowledgement of concerns of patient and caregivers   Assist patient/family to identify coping skills, available support systems and cultural and spiritual values

## 2025-04-24 NOTE — PLAN OF CARE
Problem: Risk for Elopement  Goal: Patient will not exit the unit/facility without proper excort  4/24/2025 1103 by Donta Arzola RN  Outcome: Progressing  Flowsheets (Taken 4/24/2025 1103)  Nursing Interventions for Elopement Risk:   Make sure patient has all necessary personal care items   Reduce environmental triggers  Note: Patient has not been observed to be checking the exit doors or demonstrating behaviors of attempting to leave the unit.    4/23/2025 2204 by Luci Khalil, RN  Outcome: Progressing  Flowsheets (Taken 4/23/2025 2204)  Nursing Interventions for Elopement Risk:   Reduce environmental triggers   Make sure patient has all necessary personal care items  Note: No verbalizations or attempts to leave the unit.      Problem: Anxiety  Goal: Will report anxiety at manageable levels  Description: INTERVENTIONS:1. Administer medication as ordered2. Teach and rehearse alternative coping skills3. Provide emotional support with 1:1 interaction with staff  4/24/2025 1103 by Donta Arzola RN  Outcome: Progressing  Flowsheets (Taken 4/24/2025 1103)  Will report anxiety at manageable levels:   Administer medication as ordered   Teach and rehearse alternative coping skills   Provide emotional support with 1:1 interaction with staff  Note: Patient denies anxiety so far this shift.  4/23/2025 2204 by Luci Khalil, RN  Outcome: Progressing  Flowsheets (Taken 4/23/2025 2204)  Will report anxiety at manageable levels: Administer medication as ordered  Note: Took atarax for anxiety      Problem: Coping  Goal: Pt/Family able to verbalize concerns and demonstrate effective coping strategies  Description: INTERVENTIONS:1. Assist patient/family to identify coping skills, available support systems and cultural and spiritual values2. Provide emotional support, including active listening and acknowledgement of concerns of patient and caregivers3. Reduce environmental stimuli, as able4. Instruct patient/family in relaxation

## 2025-04-24 NOTE — BH NOTE
Goal Wrap-Up/Relaxation Group    Date: 4/23/2025  Start Time: 2000  End Time:  2020    Type of Group: Relaxation    Patient Participated in group/activities appropriately      Signature: Luci Khalil RN

## 2025-04-25 VITALS
OXYGEN SATURATION: 100 % | HEIGHT: 64 IN | HEART RATE: 85 BPM | SYSTOLIC BLOOD PRESSURE: 130 MMHG | DIASTOLIC BLOOD PRESSURE: 85 MMHG | BODY MASS INDEX: 25.61 KG/M2 | RESPIRATION RATE: 14 BRPM | WEIGHT: 150 LBS | TEMPERATURE: 96.5 F

## 2025-04-25 PROCEDURE — 6370000000 HC RX 637 (ALT 250 FOR IP): Performed by: PHYSICIAN ASSISTANT

## 2025-04-25 PROCEDURE — 5130000000 HC BRIDGE APPOINTMENT

## 2025-04-25 RX ORDER — HYDROXYZINE HYDROCHLORIDE 50 MG/1
50 TABLET, FILM COATED ORAL 3 TIMES DAILY PRN
Qty: 30 TABLET | Refills: 0 | Status: SHIPPED | OUTPATIENT
Start: 2025-04-25 | End: 2025-05-05

## 2025-04-25 RX ORDER — ARIPIPRAZOLE 10 MG/1
10 TABLET ORAL DAILY
Qty: 30 TABLET | Refills: 0 | Status: SHIPPED | OUTPATIENT
Start: 2025-04-25

## 2025-04-25 RX ORDER — TRAZODONE HYDROCHLORIDE 50 MG/1
50 TABLET ORAL NIGHTLY PRN
Qty: 30 TABLET | Refills: 0 | Status: SHIPPED | OUTPATIENT
Start: 2025-04-25

## 2025-04-25 RX ADMIN — ARIPIPRAZOLE 10 MG: 10 TABLET ORAL at 07:43

## 2025-04-25 ASSESSMENT — PAIN SCALES - GENERAL: PAINLEVEL_OUTOF10: 0

## 2025-04-25 NOTE — PROGRESS NOTES
Psychosocial Assessment    Current Level of Psychosocial Functioning     Independent   Dependent    Minimal Assist     Comments:      Psychosocial High Risk Factors (check all that apply)    Unable to obtain meds   Chronic illness/pain    Substance abuse   Lack of Family Support   Financial stress   Isolation   Inadequate Community Resources  Suicide attempt(s)  Not taking medications   Victim of crime   Developmental Delay  Unable to manage personal needs    Age 65 or older   Homeless  No transportation   Readmission within 30 days  Unemployment  Traumatic Event    Family/Supports identified:     Sexual Orientation:      Patient Personal Strengths:    Patient Protective Factors:    Patient Risk Factors:     Safety plan:  {Safety Plan:04304}    CMHC/MH history:    Plan of Care:  medication management, group/individual therapies, family meetings, psycho -education, treatment team meetings to assist with stabilization    Initial Discharge Plan:      Clinical Summary:        
      Behavioral Services  Medicare Certification Upon Admission    I certify that this patient's inpatient psychiatric hospital admission is medically necessary for:    [x] (1) Treatment which could reasonably be expected to improve this patient's condition,       [x] (2) Or for diagnostic study;     AND     [x](2) The inpatient psychiatric services are provided while the individual is under the care of a physician and are included in the individualized plan of care.    Estimated length of stay/service 3-5 days    Plan for post-hospital care hc    Electronically signed by Crystal Gonzalez MD on 4/23/2025 at 9:16 PM      
24 hour chart review completed  
24 hour chart review completed   
Behavioral Health   Admission Note   Admission Type: Involuntary         Patient Strengths/Barriers  Strengths (Must Choose Two): Independent living, Stable domestic situation, Support from family, Stable housing, Support from organized community, Technical/vocation  Barriers: Other (comment) (denies any deficiency)    Addictive Behavior  In the Past 3 Months, Have You Felt or Has Someone Told You That You Have a Problem With  : None    Medical Problems:   Past Medical History:   Diagnosis Date    Abdominal pain     Fall     1/13/2024       Status EXAM:  Mental Status and Behavioral Exam  Normal: No  Level of Assistance: Independent/Self  Facial Expression: Flat, Sad, Worried, Brightened  Affect: Inappropriate  Level of Consciousness: Alert  Frequency of Checks: 4 times per hour, close  Mood:Normal: No  Mood: Depressed, Anxious, Suspicious, Empty, Sad  Motor Activity:Normal: Yes  Eye Contact: Good  Observed Behavior: Withdrawn, Cooperative  Sexual Misconduct History: Current - no  Preception: [place] to person, [place] to time, [place] to place, [place] to situation  Attention:Normal: No  Attention: Hyperalert, Distractible, Unable to concentrate  Thought Processes: Blocking, Circumstantial, Flight of ideas  Thought Content:Normal: No  Thought Content: Poverty of content  Depression Symptoms: Change in energy level, Impaired concentration, Loss of interest  Anxiety Symptoms: Generalized  Pina Symptoms: Flight of ideas  Hallucinations: None  Delusions: Yes  Delusions: Paranoid  Memory:Normal: Yes  Insight and Judgment: Yes    Pt admitted with followings belongings:  Dental Appliances: None  Vision - Corrective Lenses: None  Hearing Aid: None  Jewelry: None  Body Piercings Removed: N/A  Clothing: Shorts, Jacket/Coat, Footwear  Other Valuables:  (none)     Admission order obtained Yes  Belongings sent home with n/a .   Valuables placed in safe in security envelope, number:  n /a . Patient's home medications were n/a .  
Department of Psychiatry  Progress Note     Chief Complaint:  insomnia and psychosis     PROGRESS:  Minoo was seen in her room  She brightened and was receptive to interaction   She stated she was doing good today  She said she slept good again last night. Staff reported she slept 6.5 hours broken  I informed her of the conversation I had with her mother Hermelinda yesterday. She said that she did not have trouble sleeping for a week like her mom said. She said that her mom made that assumption from what she told her.  Minoo reported that she has been feeling calm and is no longer angry/agitated  Minoo denied any suicidal or homicidal ideation  She reported that her thoughts have been clear.  Her thoughts were linear and coherent during the interview  She reported that she has been eating good on the unit  She did take the Abilify yesterday and this morning.  She denied any side effects  She has been on the unit interacting with peers and attending groups  Her son and the father of her son came and visited her on the unit yesterday.  She also has been talking her mom on the phone.    Suicidal ideations: denies    Compliance with medications: good   Medication side effects: absent  ROS: Patient has new complaints:  no  Sleep quality: 6.5 hours broken last night per staff  Attending groups: yes      OBJECTIVE      Medications  Current Facility-Administered Medications: acetaminophen (TYLENOL) tablet 650 mg, 650 mg, Oral, Q6H PRN  ibuprofen (ADVIL;MOTRIN) tablet 400 mg, 400 mg, Oral, Q6H PRN  aluminum & magnesium hydroxide-simethicone (MAALOX PLUS) 200-200-20 MG/5ML suspension 30 mL, 30 mL, Oral, Q6H PRN  hydrOXYzine HCl (ATARAX) tablet 50 mg, 50 mg, Oral, TID PRN  traZODone (DESYREL) tablet 50 mg, 50 mg, Oral, Nightly PRN  polyethylene glycol (GLYCOLAX) packet 17 g, 17 g, Oral, Daily PRN  diphenhydrAMINE (BENADRYL) injection 50 mg, 50 mg, IntraMUSCular, Q4H PRN  ARIPiprazole (ABILIFY) tablet 10 mg, 10 mg, 
Discharge planning- Minoo is to go to [facility] for a diagnostic assessment with Paulette on Wednesday, April 30 at 1:30pm.   
care.   01:05 Call from . Patient is accepted to  under [facility] status. Family/patient updated on plan of care.

## 2025-04-25 NOTE — DISCHARGE SUMMARY
Provider Discharge Summary     Patient ID:  Minoo Jaffe  [id number]  19 y.o.  2005    Admit date: 2025    Discharge date and time: 2025  4:23 PM     Admitting Physician: Crystal Gonzalez MD     Discharge Physician: Crystal Gonzalez MD    Admission Diagnoses: Agitation [R45.1]  Pina (HCC) [F30.9]  Acute psychosis (HCC) [F23]    Discharge Diagnoses:      Acute psychosis (HCC)     Patient Active Problem List   Diagnosis Code    Intractable nausea and vomiting R11.2    Intractable vomiting with nausea R11.2    Fall due to wet surface, initial encounter W01.0XXA    Antepartum complication O26.90    Leakage of amniotic fluid O42.90    S/P  Z98.891    Acute psychosis (HCC) F23        Admission Condition: poor    Discharged Condition: stable    Indication for Admission: threat to self    History of Present Illnes (present tense wording is of findings from admission exam and are not necessarily indicative of current findings):   Minoo Jaffe is a 19 y.o. female with a history of cannabis use who presented to the emergency department  with her parents due to insomnia and psychosis.      Per the [facility] social work note:  \"Patient is single with a one year old child. Patient is under [facility] status.      Per [facility]: The pt. presented to the ER for psych evaluation with family. She is angry defensive, agitated. Patient is easily triggered. Patient keeps saying they need to just leave me the fuc... alone. Patient admits to being up for multiple days. Family reports an incident up again tonight. They explain concern for patient and her kayla safety'.      Patient reports she sleeps 'maybe twelve hours' a night. Her mother reports she has not slept in several days. Patient denies change in appetite. Patient denies use of alcohol . Patient denies thought plan or intent to harm self or others. Patient is cooperative with the interview. Patients affect is not appropriate, she is smiling

## 2025-04-25 NOTE — PLAN OF CARE
Problem: Risk for Elopement  Goal: Patient will not exit the unit/facility without proper excort  4/24/2025 2151 by Luci Khalil, RN  Outcome: Progressing  Flowsheets (Taken 4/24/2025 1103 by Donta Arzola, RN)  Nursing Interventions for Elopement Risk:   Make sure patient has all necessary personal care items   Reduce environmental triggers  Note: No verbalizations or attempts to leave the unit.         Problem: Anxiety  Goal: Will report anxiety at manageable levels  Description: INTERVENTIONS:1. Administer medication as ordered2. Teach and rehearse alternative coping skills3. Provide emotional support with 1:1 interaction with staff  4/24/2025 2151 by Luci Khalil, RN  Outcome: Progressing  Flowsheets (Taken 4/24/2025 2151)  Will report anxiety at manageable levels: Administer medication as ordered  Note: Took atarax for anxiety        Problem: Coping  Goal: Pt/Family able to verbalize concerns and demonstrate effective coping strategies  Description: INTERVENTIONS:1. Assist patient/family to identify coping skills, available support systems and cultural and spiritual values2. Provide emotional support, including active listening and acknowledgement of concerns of patient and caregivers3. Reduce environmental stimuli, as able4. Instruct patient/family in relaxation techniques, as appropriate5. Assess for spiritual pain/suffering and initiate Spiritual Care, Psychosocial Clinical Specialist consults as needed  4/24/2025 2151 by Luci Khalil, RN  Outcome: Progressing  Flowsheets (Taken 4/24/2025 2151)  Patient/family able to verbalize anxieties, fears, and concerns, and demonstrate effective coping: Reduce environmental stimuli, as able           Problem: Decision Making  Goal: Pt/Family able to effectively weigh alternatives and participate in decision making related to treatment and care  Description: INTERVENTIONS:1. Determine when there are differences between patient's view, family's view, and healthcare

## 2025-04-25 NOTE — DISCHARGE SUMMARY
Behavioral Health   Discharge Note    Pt discharged with followings belongings:   Dental Appliances: None  Vision - Corrective Lenses: None  Hearing Aid: None  Jewelry: None  Body Piercings Removed: N/A  Clothing: Shorts, Jacket/Coat, Footwear  Other Valuables:  (none)   Valuables retrieved from safe, security envelope number:  n/a and returned to patient.  Patient left department with this RN to grandmothers car via ambulatory.  Discharged to grandmothers home. \"An Important Message from Medicare About Your Rights\" (IMM) form photocopy original from admission and provided to pt at least 4 hours prior to discharge N/A. \"An Important Message from [employer] About Your Rights\" (IMM) form photocopy original from admission. N/A. If pt left within 4 hours of receiving 2nd delivery of IMM, this is because pt was agreeable with hospital discharge.  Patient/guardian education on aftercare instructions: Yes  Bridge appointment completed:  yes.  Reviewed Discharge Instructions with patient/family/nursing facility.  Patient/family verbalizes understanding and agreement with the discharge plan using the teachback method.   Information faxed to n/a by n/a Patient/family verbalize understanding of AVS:Yes    Status EXAM upon discharge:  Mental Status and Behavioral Exam  Normal: Yes  Level of Assistance: Independent/Self  Facial Expression: Brightened  Affect: Appropriate  Level of Consciousness: Alert  Frequency of Checks: 4 times per hour, close  Mood:Normal: Yes  Mood: Other (comment) (denies anxiety or depression)  Motor Activity:Normal: Yes  Eye Contact: Good  Observed Behavior: Cooperative, Friendly  Sexual Misconduct History: Current - no  Preception: [place] to person, [place] to time, [place] to place, [place] to situation  Attention:Normal: Yes  Attention: Hyperalert  Thought Processes: Unremarkable  Thought Content:Normal: Yes  Thought Content: Poverty of content  Depression Symptoms: No problems reported or

## 2025-04-25 NOTE — GROUP NOTE
Group Therapy Note    Date: 4/24/2025    Group Start Time: 0810  Group End Time: 0840  Group Topic: Relaxation    [facility] Adult [facility] 7E    Sabrina Mccurdy CTRS        Group Therapy Note    Attendees: 8       Patient's Goal:  To enhance participants' knowledge about mental health topics by engaging in a Mental Health Jeopardy game, improving awareness of key mental health issues    Notes: Pt. Was actively engaged in a Mental Health Jeopardy game participants will be able to correctly answer related to mental health topics, demonstrating improved understanding of key mental health concepts such as true or false statements regarding mental health, coping strategies, and available mental health resources, Goal setting and conversations regarding policy and procedures on the unit.      Status After Intervention:  Improved    Participation Level: Active Listener and Interactive    Participation Quality: Appropriate and Attentive      Speech:  normal      Thought Process/Content: Logical  Linear      Affective Functioning: Congruent      Mood: euthymic      Level of consciousness:  Alert, Oriented x4, and Attentive      Response to Learning: Able to verbalize current knowledge/experience, Able to verbalize/acknowledge new learning, Able to retain information, Capable of insight, Able to change behavior, and Progressing to goal      Endings: None Reported    Modes of Intervention: Education, Support, Socialization, Exploration, Clarifying, Problem-solving, and Activity      Discipline Responsible: Recreational Therapist      Signature:  CYRIL Bucio

## 2025-04-25 NOTE — TRANSITION OF CARE
Behavioral Health Transition Record    Patient Name: Minoo Jaffe  YOB: 2005   Medical Record Number: [id number]  Date of Admission: 4/22/2025  9:00 PM   Date of Discharge: 4/25/2025    Attending Provider: Crystal Gonzalez,*   Discharging Provider: Dr. Gonzalez  To contact this individual call [phone] and ask the  to page.  If unavailable, ask to be transferred to Behavioral Health Provider on call.  A Behavioral Health Provider will be available on call 24/7 and during holidays.    Primary Care Provider: No primary care provider on file.    No Known Allergies    Reason for Admission: Acute psychosis    Admission Diagnosis: Agitation [R45.1]  Pina (HCC) [F30.9]  Acute psychosis (HCC) [F23]    * No surgery found *    Results for orders placed or performed during the hospital encounter of 04/22/25   Acetaminophen Level   Result Value Ref Range    Acetaminophen Level < 5.0 (L) 10.0 - 30.0 ug/mL   Basic Metabolic Panel   Result Value Ref Range    Sodium 139 135 - 145 meq/L    Potassium 3.8 3.5 - 5.2 meq/L    Chloride 108 98 - 111 meq/L    CO2 13 (L) 22 - 29 meq/L    Glucose 72 (L) 74 - 109 mg/dL    BUN 10 8 - 23 mg/dL    Creatinine 0.8 0.5 - 0.9 mg/dL    Calcium 9.3 8.6 - 10.0 mg/dL   CBC with Auto Differential   Result Value Ref Range    WBC 5.9 4.8 - 10.8 thou/mm3    RBC 4.12 (L) 4.20 - 5.40 mill/mm3    Hemoglobin 11.3 (L) 12.0 - 16.0 gm/dl    Hematocrit 36.5 (L) 37.0 - 47.0 %    MCV 88.6 81.0 - 99.0 fL    MCH 27.4 26.0 - 33.0 pg    MCHC 31.0 (L) 32.2 - 35.5 gm/dl    RDW-CV 17.3 (H) 11.5 - 14.5 %    RDW-SD 55.4 (H) 35.0 - 45.0 fL    Platelets 327 130 - 400 thou/mm3    MPV 10.9 9.4 - 12.4 fL    Seg Neutrophils 51.5 %    Lymphocytes 38.0 %    Monocytes % 8.1 %    Eosinophils 1.7 %    Basophils 0.5 %    Immature Granulocytes % 0.2 %    Platelet Estimate ADEQUATE Adequate    Neutrophils Absolute 3.0 1.8 - 7.7 thou/mm3    Lymphocytes Absolute 2.2 1.0 - 4.8 thou/mm3

## 2025-04-25 NOTE — DISCHARGE INSTRUCTIONS
[facility] Hotline:  [phone]    Crisis phone numbers:  On license of [facility], and [facility] [phone].  [facility], and [facility] [phone]  [facility] [phone].  [facility] [phone].  [facility] [phone].  [facility] [phone].    [facility]  [address], [place] [address]  [phone]    [facility] [place] [facility]  [address] [facility]  [address] [place], [place] [address]  [place], [place] [id number] [phone]    [facility]  [address] [address] [address] [facility] [place] [address]  [phone]    [facility]  [address] [place] [address]  [phone]    [facility]  [address] [place] [address]  [phone]    [facility] [place]  [address], [place] [id number] [phone]    [facility]  [address] [place] [address]  [phone]    [facility]  [address] [place], [place] [id number] [phone]    [facility]  [address] [address]  [phone]    [address] [address]  [phone]    [facility]  [address] [place], [place] [address] [phone]    [facility]  [address] [place] [address]  [phone]    [facility]  [address] [place], [place] [address] [phone]

## 2025-05-02 ENCOUNTER — TELEPHONE (OUTPATIENT)
Age: 20
End: 2025-05-02

## (undated) DEVICE — SOLUTION SCRB 4OZ 4% CHG H2O AIDED FOR PREOPERATIVE SKIN

## (undated) DEVICE — SUTURE CHROMIC GUT SZ 1 L36IN ABSRB BRN L48MM CTX 1/2 CIR 905H

## (undated) DEVICE — PACK PROCEDURE SURG C SECT SRMC LF

## (undated) DEVICE — GLOVE ORANGE PI 7   MSG9070

## (undated) DEVICE — SUTURE VCRL SZ 4-0 L27IN ABSRB UD L60MM KS STR REV CUT NDL J662H

## (undated) DEVICE — DRESSING ANITMICROBIAL FOAM OPTIFOAM POSTOP STRP 35 X 10 IN

## (undated) DEVICE — SUTURE VCRL + SZ 0 L27IN ABSRB UD L36MM CT-1 1/2 CIR VCPP41D

## (undated) DEVICE — SUTURE PLN GUT SZ 2-0 L27IN ABSRB YELLOWISH TAN L36MM CT-1 843H

## (undated) DEVICE — SOLUTION IRRIG 1000ML 0.9% SOD CHL USP POUR PLAS BTL

## (undated) DEVICE — SOLUTION SURG PREP 26 CC PURPREP

## (undated) DEVICE — SUTURE ABSORBABLE MONOFILAMENT 0 CTX 60 IN VIO PDS + PDP990G

## (undated) DEVICE — SOLUTION IRRIG 1000ML STRL H2O USP PLAS POUR BTL